# Patient Record
Sex: FEMALE | Race: OTHER | Employment: UNEMPLOYED | URBAN - METROPOLITAN AREA
[De-identification: names, ages, dates, MRNs, and addresses within clinical notes are randomized per-mention and may not be internally consistent; named-entity substitution may affect disease eponyms.]

---

## 2017-05-02 ENCOUNTER — HOSPITAL ENCOUNTER (OUTPATIENT)
Age: 27
Setting detail: SPECIMEN
Discharge: HOME | End: 2017-05-02
Payer: MEDICARE

## 2017-05-02 ENCOUNTER — INITIAL PRENATAL (OUTPATIENT)
Dept: OBGYN CLINIC | Age: 27
End: 2017-05-02
Payer: MEDICARE

## 2017-05-02 ENCOUNTER — HOSPITAL ENCOUNTER (OUTPATIENT)
Age: 27
Discharge: HOME | End: 2017-05-02
Payer: MEDICARE

## 2017-05-02 VITALS
HEART RATE: 78 BPM | DIASTOLIC BLOOD PRESSURE: 70 MMHG | SYSTOLIC BLOOD PRESSURE: 114 MMHG | WEIGHT: 143 LBS | BODY MASS INDEX: 24.55 KG/M2

## 2017-05-02 DIAGNOSIS — Z3A.14 14 WEEKS GESTATION OF PREGNANCY: Primary | ICD-10-CM

## 2017-05-02 DIAGNOSIS — O99.331 TOBACCO SMOKING AFFECTING PREGNANCY IN FIRST TRIMESTER: ICD-10-CM

## 2017-05-02 DIAGNOSIS — O98.811 CHLAMYDIA INFECTION COMPLICATING PREGNANCY IN FIRST TRIMESTER: ICD-10-CM

## 2017-05-02 DIAGNOSIS — Z32.01 POSITIVE PREGNANCY TEST: ICD-10-CM

## 2017-05-02 DIAGNOSIS — A74.9 CHLAMYDIA INFECTION COMPLICATING PREGNANCY IN FIRST TRIMESTER: ICD-10-CM

## 2017-05-02 DIAGNOSIS — O23.00: ICD-10-CM

## 2017-05-02 DIAGNOSIS — F19.11 H/O DRUG ABUSE (HCC): ICD-10-CM

## 2017-05-02 PROBLEM — N12 NEPHROPYELITIS: Status: ACTIVE | Noted: 2017-03-16

## 2017-05-02 PROBLEM — H52.10 ERROR, REFRACTIVE, MYOPIA: Status: ACTIVE | Noted: 2017-01-16

## 2017-05-02 PROBLEM — Z86.19 HISTORY OF POSITIVE HEPATITIS C: Status: ACTIVE | Noted: 2017-05-02

## 2017-05-02 LAB
ABO/RH: NORMAL
ABSOLUTE EOS #: 0.1 K/UL (ref 0–0.4)
ABSOLUTE LYMPH #: 1.5 K/UL (ref 1–4.8)
ABSOLUTE MONO #: 0.5 K/UL (ref 0.1–1.3)
ANTIBODY SCREEN: NEGATIVE
BASOPHILS # BLD: 1 %
BASOPHILS ABSOLUTE: 0.1 K/UL (ref 0–0.2)
BILIRUBIN URINE: NEGATIVE
COLOR: YELLOW
COMMENT UA: NORMAL
CONTROL: ABNORMAL
DIFFERENTIAL TYPE: NORMAL
EOSINOPHILS RELATIVE PERCENT: 1 %
GLUCOSE BLD-MCNC: 119 MG/DL (ref 70–99)
GLUCOSE URINE: NEGATIVE
HCG QUANTITATIVE: ABNORMAL IU/L
HCT VFR BLD CALC: 38.4 % (ref 36–46)
HEMOGLOBIN: 12.8 G/DL (ref 12–16)
HEPATITIS B SURFACE ANTIGEN: NONREACTIVE
HIV AG/AB: NONREACTIVE
KETONES, URINE: NEGATIVE
LEUKOCYTE ESTERASE, URINE: NEGATIVE
LYMPHOCYTES # BLD: 15 %
MCH RBC QN AUTO: 29.4 PG (ref 26–34)
MCHC RBC AUTO-ENTMCNC: 33.3 G/DL (ref 31–37)
MCV RBC AUTO: 88.1 FL (ref 80–100)
MONOCYTES # BLD: 5 %
NITRITE, URINE: NEGATIVE
PDW BLD-RTO: 13.3 % (ref 11.5–14.9)
PH UA: 6.5 (ref 5–8)
PLATELET # BLD: 197 K/UL (ref 150–450)
PLATELET ESTIMATE: NORMAL
PMV BLD AUTO: 10.8 FL (ref 6–12)
PREGNANCY TEST URINE, POC: POSITIVE
PROTEIN UA: NEGATIVE
RBC # BLD: 4.35 M/UL (ref 4–5.2)
RBC # BLD: NORMAL 10*6/UL
RUBV IGG SER QL: 60.8 IU/ML
SEG NEUTROPHILS: 78 %
SEGMENTED NEUTROPHILS ABSOLUTE COUNT: 7.8 K/UL (ref 1.3–9.1)
SPECIFIC GRAVITY UA: 1.01 (ref 1–1.03)
T. PALLIDUM, IGG: NONREACTIVE
TSH SERPL DL<=0.05 MIU/L-ACNC: 0.4 MIU/L (ref 0.3–5)
TURBIDITY: CLEAR
URINE HGB: NEGATIVE
UROBILINOGEN, URINE: NORMAL
WBC # BLD: 9.9 K/UL (ref 3.5–11)
WBC # BLD: NORMAL 10*3/UL

## 2017-05-02 PROCEDURE — 84450 TRANSFERASE (AST) (SGOT): CPT

## 2017-05-02 PROCEDURE — 84702 CHORIONIC GONADOTROPIN TEST: CPT

## 2017-05-02 PROCEDURE — 86762 RUBELLA ANTIBODY: CPT

## 2017-05-02 PROCEDURE — 81003 URINALYSIS AUTO W/O SCOPE: CPT

## 2017-05-02 PROCEDURE — 86900 BLOOD TYPING SEROLOGIC ABO: CPT

## 2017-05-02 PROCEDURE — 87340 HEPATITIS B SURFACE AG IA: CPT

## 2017-05-02 PROCEDURE — 85025 COMPLETE CBC W/AUTO DIFF WBC: CPT

## 2017-05-02 PROCEDURE — 86780 TREPONEMA PALLIDUM: CPT

## 2017-05-02 PROCEDURE — 84443 ASSAY THYROID STIM HORMONE: CPT

## 2017-05-02 PROCEDURE — 36415 COLL VENOUS BLD VENIPUNCTURE: CPT

## 2017-05-02 PROCEDURE — 81220 CFTR GENE COM VARIANTS: CPT

## 2017-05-02 PROCEDURE — 86850 RBC ANTIBODY SCREEN: CPT

## 2017-05-02 PROCEDURE — 87086 URINE CULTURE/COLONY COUNT: CPT

## 2017-05-02 PROCEDURE — 87491 CHLMYD TRACH DNA AMP PROBE: CPT

## 2017-05-02 PROCEDURE — 81025 URINE PREGNANCY TEST: CPT | Performed by: ADVANCED PRACTICE MIDWIFE

## 2017-05-02 PROCEDURE — 87389 HIV-1 AG W/HIV-1&-2 AB AG IA: CPT

## 2017-05-02 PROCEDURE — 99213 OFFICE O/P EST LOW 20 MIN: CPT | Performed by: ADVANCED PRACTICE MIDWIFE

## 2017-05-02 PROCEDURE — 86901 BLOOD TYPING SEROLOGIC RH(D): CPT

## 2017-05-02 PROCEDURE — 84460 ALANINE AMINO (ALT) (SGPT): CPT

## 2017-05-02 PROCEDURE — 82947 ASSAY GLUCOSE BLOOD QUANT: CPT

## 2017-05-02 PROCEDURE — 86803 HEPATITIS C AB TEST: CPT

## 2017-05-02 PROCEDURE — 87591 N.GONORRHOEAE DNA AMP PROB: CPT

## 2017-05-02 PROCEDURE — 87070 CULTURE OTHR SPECIMN AEROBIC: CPT

## 2017-05-02 PROCEDURE — G0145 SCR C/V CYTO,THINLAYER,RESCR: HCPCS

## 2017-05-02 RX ORDER — CEFDINIR 300 MG/1
CAPSULE ORAL
Refills: 3 | COMMUNITY
Start: 2017-03-21 | End: 2017-05-24 | Stop reason: ALTCHOICE

## 2017-05-03 LAB
C TRACH DNA GENITAL QL NAA+PROBE: NEGATIVE
CULTURE: NORMAL
CULTURE: NORMAL
Lab: NORMAL
N. GONORRHOEAE DNA: NEGATIVE
SPECIMEN DESCRIPTION: NORMAL
SPECIMEN DESCRIPTION: NORMAL
STATUS: NORMAL

## 2017-05-04 ENCOUNTER — TELEPHONE (OUTPATIENT)
Dept: OBGYN CLINIC | Age: 27
End: 2017-05-04

## 2017-05-04 DIAGNOSIS — Z86.19 HISTORY OF POSITIVE HEPATITIS C: Primary | ICD-10-CM

## 2017-05-04 PROBLEM — R87.612 LGSIL OF CERVIX OF UNDETERMINED SIGNIFICANCE: Status: ACTIVE | Noted: 2017-05-04

## 2017-05-04 LAB
ALT SERPL-CCNC: 16 U/L (ref 5–33)
AST SERPL-CCNC: 15 U/L
CYTOLOGY REPORT: NORMAL
HEPATITIS C ANTIBODY: REACTIVE

## 2017-05-05 LAB
CULTURE: NORMAL
Lab: NORMAL
SPECIMEN DESCRIPTION: NORMAL
SPECIMEN DESCRIPTION: NORMAL
STATUS: NORMAL

## 2017-05-11 LAB — CYSTIC FIBROSIS: NORMAL

## 2017-05-12 ENCOUNTER — HOSPITAL ENCOUNTER (OUTPATIENT)
Age: 27
Discharge: HOME | End: 2017-05-12
Payer: MEDICARE

## 2017-05-12 DIAGNOSIS — Z86.19 HISTORY OF POSITIVE HEPATITIS C: ICD-10-CM

## 2017-05-12 LAB
ALT SERPL-CCNC: 11 U/L (ref 5–33)
AST SERPL-CCNC: 11 U/L
HEPATITIS C ANTIBODY: REACTIVE

## 2017-05-12 PROCEDURE — 84450 TRANSFERASE (AST) (SGOT): CPT

## 2017-05-12 PROCEDURE — 87522 HEPATITIS C REVRS TRNSCRPJ: CPT

## 2017-05-12 PROCEDURE — 36415 COLL VENOUS BLD VENIPUNCTURE: CPT

## 2017-05-12 PROCEDURE — 84460 ALANINE AMINO (ALT) (SGPT): CPT

## 2017-05-12 PROCEDURE — 86803 HEPATITIS C AB TEST: CPT

## 2017-05-15 ENCOUNTER — TELEPHONE (OUTPATIENT)
Dept: OBGYN CLINIC | Age: 27
End: 2017-05-15

## 2017-05-15 PROBLEM — R76.8 HEPATITIS C ANTIBODY POSITIVE IN BLOOD: Status: ACTIVE | Noted: 2017-05-02

## 2017-05-16 LAB
DIRECT EXAM: NORMAL
Lab: NORMAL
SPECIMEN DESCRIPTION: NORMAL
STATUS: NORMAL

## 2017-05-17 ENCOUNTER — TELEPHONE (OUTPATIENT)
Dept: OBGYN CLINIC | Age: 27
End: 2017-05-17

## 2017-05-24 ENCOUNTER — ROUTINE PRENATAL (OUTPATIENT)
Dept: OBGYN CLINIC | Age: 27
End: 2017-05-24
Payer: MEDICARE

## 2017-05-24 VITALS
HEART RATE: 72 BPM | BODY MASS INDEX: 24.89 KG/M2 | SYSTOLIC BLOOD PRESSURE: 116 MMHG | WEIGHT: 145 LBS | DIASTOLIC BLOOD PRESSURE: 64 MMHG

## 2017-05-24 DIAGNOSIS — O23.00: ICD-10-CM

## 2017-05-24 DIAGNOSIS — O09.892: ICD-10-CM

## 2017-05-24 DIAGNOSIS — Z3A.17 17 WEEKS GESTATION OF PREGNANCY: Primary | ICD-10-CM

## 2017-05-24 DIAGNOSIS — R76.8 HEPATITIS C ANTIBODY POSITIVE IN BLOOD: ICD-10-CM

## 2017-05-24 DIAGNOSIS — A74.9 CHLAMYDIA INFECTION COMPLICATING PREGNANCY IN FIRST TRIMESTER: ICD-10-CM

## 2017-05-24 DIAGNOSIS — O98.811 CHLAMYDIA INFECTION COMPLICATING PREGNANCY IN FIRST TRIMESTER: ICD-10-CM

## 2017-05-24 DIAGNOSIS — O09.32 LATE PRENATAL CARE AFFECTING PREGNANCY IN SECOND TRIMESTER: Primary | ICD-10-CM

## 2017-05-24 DIAGNOSIS — F19.11 H/O DRUG ABUSE (HCC): ICD-10-CM

## 2017-05-24 DIAGNOSIS — Z87.440: ICD-10-CM

## 2017-05-24 DIAGNOSIS — O99.331 TOBACCO SMOKING AFFECTING PREGNANCY IN FIRST TRIMESTER: ICD-10-CM

## 2017-05-24 PROCEDURE — 99213 OFFICE O/P EST LOW 20 MIN: CPT | Performed by: ADVANCED PRACTICE MIDWIFE

## 2017-05-24 RX ORDER — PNV NO.95/FERROUS FUM/FOLIC AC 28MG-0.8MG
TABLET ORAL
COMMUNITY
End: 2020-06-08

## 2017-05-24 RX ORDER — FAMOTIDINE 20 MG/1
20 TABLET, FILM COATED ORAL 2 TIMES DAILY
Qty: 60 TABLET | Refills: 1 | Status: SHIPPED | OUTPATIENT
Start: 2017-05-24 | End: 2017-08-09

## 2017-06-21 ENCOUNTER — TELEPHONE (OUTPATIENT)
Dept: OBGYN CLINIC | Age: 27
End: 2017-06-21

## 2017-06-28 ENCOUNTER — TELEPHONE (OUTPATIENT)
Dept: OBGYN CLINIC | Age: 27
End: 2017-06-28

## 2017-06-29 ENCOUNTER — HOSPITAL ENCOUNTER (OUTPATIENT)
Age: 27
Setting detail: OBSERVATION
Discharge: HOME | End: 2017-06-30
Attending: OBSTETRICS & GYNECOLOGY | Admitting: OBSTETRICS & GYNECOLOGY
Payer: MEDICARE

## 2017-06-29 PROBLEM — F14.90 COCAINE USE: Status: ACTIVE | Noted: 2017-06-29

## 2017-06-29 PROBLEM — N93.9 VAGINAL BLEEDING: Status: ACTIVE | Noted: 2017-06-29

## 2017-06-29 LAB
-: ABNORMAL
ABSOLUTE EOS #: 0.2 K/UL (ref 0–0.4)
ABSOLUTE LYMPH #: 1.4 K/UL (ref 1–4.8)
ABSOLUTE MONO #: 0.6 K/UL (ref 0.1–1.2)
AMORPHOUS: ABNORMAL
AMPHETAMINE SCREEN URINE: NEGATIVE
BACTERIA: ABNORMAL
BARBITURATE SCREEN URINE: NEGATIVE
BASOPHILS # BLD: 0 %
BASOPHILS ABSOLUTE: 0 K/UL (ref 0–0.2)
BENZODIAZEPINE SCREEN, URINE: NEGATIVE
BILIRUBIN URINE: NEGATIVE
BUPRENORPHINE URINE: ABNORMAL
CANNABINOID SCREEN URINE: NEGATIVE
CASTS UA: ABNORMAL /LPF (ref 0–8)
COCAINE METABOLITE, URINE: POSITIVE
COLOR: YELLOW
CRYSTALS, UA: ABNORMAL /HPF
DIFFERENTIAL TYPE: ABNORMAL
EOSINOPHILS RELATIVE PERCENT: 2 %
EPITHELIAL CELLS UA: ABNORMAL /HPF (ref 0–5)
GLUCOSE URINE: NEGATIVE
HCT VFR BLD CALC: 34.8 % (ref 36–46)
HEMOGLOBIN: 11.8 G/DL (ref 12–16)
KETONES, URINE: NEGATIVE
LEUKOCYTE ESTERASE, URINE: NEGATIVE
LYMPHOCYTES # BLD: 17 %
MCH RBC QN AUTO: 29.7 PG (ref 26–34)
MCHC RBC AUTO-ENTMCNC: 33.9 G/DL (ref 31–37)
MCV RBC AUTO: 87.5 FL (ref 80–100)
MDMA URINE: ABNORMAL
METHADONE SCREEN, URINE: NEGATIVE
METHAMPHETAMINE, URINE: ABNORMAL
MONOCYTES # BLD: 7 %
MUCUS: ABNORMAL
NITRITE, URINE: NEGATIVE
OPIATES, URINE: NEGATIVE
OTHER OBSERVATIONS UA: ABNORMAL
OXYCODONE SCREEN URINE: NEGATIVE
PDW BLD-RTO: 14.5 % (ref 12.5–15.4)
PH UA: 6.5 (ref 5–8)
PHENCYCLIDINE, URINE: NEGATIVE
PLATELET # BLD: 215 K/UL (ref 140–450)
PLATELET ESTIMATE: ABNORMAL
PMV BLD AUTO: 9.2 FL (ref 6–12)
PROPOXYPHENE, URINE: ABNORMAL
PROTEIN UA: NEGATIVE
RBC # BLD: 3.98 M/UL (ref 4–5.2)
RBC # BLD: ABNORMAL 10*6/UL
RBC UA: ABNORMAL /HPF (ref 0–4)
RENAL EPITHELIAL, UA: ABNORMAL /HPF
SEG NEUTROPHILS: 74 %
SEGMENTED NEUTROPHILS ABSOLUTE COUNT: 6.3 K/UL (ref 1.8–7.7)
SPECIFIC GRAVITY UA: 1.01 (ref 1–1.03)
TEST INFORMATION: ABNORMAL
TRICHOMONAS: ABNORMAL
TRICYCLIC ANTIDEPRESSANTS, UR: ABNORMAL
TURBIDITY: CLEAR
URINE HGB: ABNORMAL
UROBILINOGEN, URINE: NORMAL
WBC # BLD: 8.5 K/UL (ref 3.5–11)
WBC # BLD: ABNORMAL 10*3/UL
WBC UA: ABNORMAL /HPF (ref 0–5)
YEAST: ABNORMAL

## 2017-06-29 PROCEDURE — 80307 DRUG TEST PRSMV CHEM ANLYZR: CPT

## 2017-06-29 PROCEDURE — 85025 COMPLETE CBC W/AUTO DIFF WBC: CPT

## 2017-06-29 PROCEDURE — 36415 COLL VENOUS BLD VENIPUNCTURE: CPT

## 2017-06-29 PROCEDURE — 81001 URINALYSIS AUTO W/SCOPE: CPT

## 2017-06-29 PROCEDURE — 87086 URINE CULTURE/COLONY COUNT: CPT

## 2017-06-29 PROCEDURE — 6370000000 HC RX 637 (ALT 250 FOR IP): Performed by: OBSTETRICS & GYNECOLOGY

## 2017-06-29 RX ORDER — ACETAMINOPHEN 500 MG
1000 TABLET ORAL ONCE
Status: COMPLETED | OUTPATIENT
Start: 2017-06-29 | End: 2017-06-29

## 2017-06-29 RX ADMIN — ACETAMINOPHEN 1000 MG: 500 TABLET ORAL at 19:56

## 2017-06-30 VITALS
DIASTOLIC BLOOD PRESSURE: 45 MMHG | SYSTOLIC BLOOD PRESSURE: 85 MMHG | RESPIRATION RATE: 18 BRPM | HEART RATE: 64 BPM | TEMPERATURE: 97.7 F

## 2017-06-30 LAB
ABSOLUTE EOS #: 0.3 K/UL (ref 0–0.4)
ABSOLUTE LYMPH #: 2.1 K/UL (ref 1–4.8)
ABSOLUTE MONO #: 0.7 K/UL (ref 0.1–1.2)
BASOPHILS # BLD: 0 %
BASOPHILS ABSOLUTE: 0 K/UL (ref 0–0.2)
CULTURE: NORMAL
CULTURE: NORMAL
DIFFERENTIAL TYPE: ABNORMAL
EOSINOPHILS RELATIVE PERCENT: 4 %
HCT VFR BLD CALC: 34.2 % (ref 36–46)
HEMOGLOBIN: 11.7 G/DL (ref 12–16)
LYMPHOCYTES # BLD: 23 %
Lab: NORMAL
MCH RBC QN AUTO: 30.1 PG (ref 26–34)
MCHC RBC AUTO-ENTMCNC: 34.3 G/DL (ref 31–37)
MCV RBC AUTO: 87.8 FL (ref 80–100)
MONOCYTES # BLD: 7 %
PDW BLD-RTO: 13.8 % (ref 12.5–15.4)
PLATELET # BLD: 198 K/UL (ref 140–450)
PLATELET ESTIMATE: ABNORMAL
PMV BLD AUTO: 9.7 FL (ref 6–12)
RBC # BLD: 3.89 M/UL (ref 4–5.2)
RBC # BLD: ABNORMAL 10*6/UL
SEG NEUTROPHILS: 66 %
SEGMENTED NEUTROPHILS ABSOLUTE COUNT: 6 K/UL (ref 1.8–7.7)
SPECIMEN DESCRIPTION: NORMAL
STATUS: NORMAL
WBC # BLD: 9.1 K/UL (ref 3.5–11)
WBC # BLD: ABNORMAL 10*3/UL

## 2017-06-30 PROCEDURE — 99213 OFFICE O/P EST LOW 20 MIN: CPT

## 2017-06-30 PROCEDURE — 6370000000 HC RX 637 (ALT 250 FOR IP): Performed by: STUDENT IN AN ORGANIZED HEALTH CARE EDUCATION/TRAINING PROGRAM

## 2017-06-30 PROCEDURE — 85025 COMPLETE CBC W/AUTO DIFF WBC: CPT

## 2017-06-30 PROCEDURE — G0378 HOSPITAL OBSERVATION PER HR: HCPCS

## 2017-06-30 PROCEDURE — 36415 COLL VENOUS BLD VENIPUNCTURE: CPT

## 2017-06-30 RX ORDER — ACETAMINOPHEN 500 MG
1000 TABLET ORAL ONCE
Status: DISCONTINUED | OUTPATIENT
Start: 2017-06-30 | End: 2017-06-30 | Stop reason: HOSPADM

## 2017-06-30 RX ORDER — ACETAMINOPHEN 500 MG
1000 TABLET ORAL EVERY 6 HOURS PRN
Status: DISCONTINUED | OUTPATIENT
Start: 2017-06-30 | End: 2017-06-30 | Stop reason: HOSPADM

## 2017-06-30 RX ADMIN — ACETAMINOPHEN 1000 MG: 500 TABLET ORAL at 10:16

## 2017-06-30 ASSESSMENT — PAIN DESCRIPTION - LOCATION: LOCATION: BACK

## 2017-06-30 ASSESSMENT — PAIN SCALES - GENERAL
PAINLEVEL_OUTOF10: 6
PAINLEVEL_OUTOF10: 3

## 2017-07-06 ENCOUNTER — TELEPHONE (OUTPATIENT)
Dept: OBGYN CLINIC | Age: 27
End: 2017-07-06

## 2017-07-13 ENCOUNTER — TELEPHONE (OUTPATIENT)
Dept: OBGYN CLINIC | Age: 27
End: 2017-07-13

## 2017-07-13 PROBLEM — Z91.199 NONCOMPLIANT PREGNANT PATIENT: Status: ACTIVE | Noted: 2017-07-13

## 2017-07-13 PROBLEM — O09.899 NONCOMPLIANT PREGNANT PATIENT: Status: ACTIVE | Noted: 2017-07-13

## 2017-07-18 ENCOUNTER — TELEPHONE (OUTPATIENT)
Dept: OBGYN CLINIC | Age: 27
End: 2017-07-18

## 2017-07-20 ENCOUNTER — ROUTINE PRENATAL (OUTPATIENT)
Dept: OBGYN CLINIC | Age: 27
End: 2017-07-20
Payer: MEDICARE

## 2017-07-20 VITALS
WEIGHT: 144 LBS | BODY MASS INDEX: 24.72 KG/M2 | HEART RATE: 68 BPM | SYSTOLIC BLOOD PRESSURE: 102 MMHG | DIASTOLIC BLOOD PRESSURE: 64 MMHG

## 2017-07-20 DIAGNOSIS — O09.899 NONCOMPLIANT PREGNANT PATIENT, UNSPECIFIED TRIMESTER: ICD-10-CM

## 2017-07-20 DIAGNOSIS — Z91.199 NONCOMPLIANT PREGNANT PATIENT, UNSPECIFIED TRIMESTER: ICD-10-CM

## 2017-07-20 DIAGNOSIS — O09.32 LATE PRENATAL CARE AFFECTING PREGNANCY IN SECOND TRIMESTER: ICD-10-CM

## 2017-07-20 DIAGNOSIS — R87.612 LGSIL OF CERVIX OF UNDETERMINED SIGNIFICANCE: ICD-10-CM

## 2017-07-20 DIAGNOSIS — O03.9 SAB (SPONTANEOUS ABORTION): ICD-10-CM

## 2017-07-20 DIAGNOSIS — O09.92 HRP (HIGH RISK PREGNANCY), SECOND TRIMESTER: Primary | ICD-10-CM

## 2017-07-20 DIAGNOSIS — F19.11 H/O DRUG ABUSE (HCC): ICD-10-CM

## 2017-07-20 DIAGNOSIS — Z3A.25 25 WEEKS GESTATION OF PREGNANCY: ICD-10-CM

## 2017-07-20 DIAGNOSIS — O02.0 BLIGHTED OVUM: ICD-10-CM

## 2017-07-20 DIAGNOSIS — R41.3 MEMORY LOSS: ICD-10-CM

## 2017-07-20 DIAGNOSIS — O23.00: ICD-10-CM

## 2017-07-20 PROCEDURE — 99213 OFFICE O/P EST LOW 20 MIN: CPT | Performed by: OBSTETRICS & GYNECOLOGY

## 2017-08-09 ENCOUNTER — ROUTINE PRENATAL (OUTPATIENT)
Dept: OBGYN CLINIC | Age: 27
End: 2017-08-09
Payer: MEDICARE

## 2017-08-09 VITALS
BODY MASS INDEX: 24.37 KG/M2 | HEART RATE: 68 BPM | WEIGHT: 142 LBS | DIASTOLIC BLOOD PRESSURE: 64 MMHG | SYSTOLIC BLOOD PRESSURE: 114 MMHG

## 2017-08-09 DIAGNOSIS — Z3A.28 28 WEEKS GESTATION OF PREGNANCY: Primary | ICD-10-CM

## 2017-08-09 PROCEDURE — 99213 OFFICE O/P EST LOW 20 MIN: CPT | Performed by: ADVANCED PRACTICE MIDWIFE

## 2017-08-29 PROBLEM — O09.899 NONCOMPLIANT PREGNANT PATIENT: Status: RESOLVED | Noted: 2017-07-13 | Resolved: 2017-08-29

## 2017-08-29 PROBLEM — O23.00: Status: RESOLVED | Noted: 2017-05-02 | Resolved: 2017-08-29

## 2017-08-29 PROBLEM — Z91.199 NONCOMPLIANT PREGNANT PATIENT: Status: RESOLVED | Noted: 2017-07-13 | Resolved: 2017-08-29

## 2017-08-29 PROBLEM — R76.8 HEPATITIS C ANTIBODY POSITIVE IN BLOOD: Status: RESOLVED | Noted: 2017-05-02 | Resolved: 2017-08-29

## 2017-08-29 PROBLEM — F14.90 COCAINE USE: Status: RESOLVED | Noted: 2017-06-29 | Resolved: 2017-08-29

## 2017-08-29 PROBLEM — O99.331 TOBACCO SMOKING AFFECTING PREGNANCY IN FIRST TRIMESTER: Status: RESOLVED | Noted: 2017-05-02 | Resolved: 2017-08-29

## 2019-03-02 ENCOUNTER — HOSPITAL ENCOUNTER (EMERGENCY)
Age: 29
Discharge: HOME | End: 2019-03-02
Attending: EMERGENCY MEDICINE
Payer: MEDICARE

## 2019-03-02 ENCOUNTER — APPOINTMENT (OUTPATIENT)
Dept: CT IMAGING | Age: 29
End: 2019-03-02
Payer: MEDICARE

## 2019-03-02 DIAGNOSIS — R11.2 NON-INTRACTABLE VOMITING WITH NAUSEA, UNSPECIFIED VOMITING TYPE: Primary | ICD-10-CM

## 2019-03-02 DIAGNOSIS — K29.00 ACUTE GASTRITIS WITHOUT HEMORRHAGE, UNSPECIFIED GASTRITIS TYPE: ICD-10-CM

## 2019-03-02 LAB
-: ABNORMAL
ABSOLUTE EOS #: 0.1 K/UL (ref 0–0.4)
ABSOLUTE IMMATURE GRANULOCYTE: ABNORMAL K/UL (ref 0–0.3)
ABSOLUTE LYMPH #: 1.1 K/UL (ref 1–4.8)
ABSOLUTE MONO #: 0.1 K/UL (ref 0.2–0.8)
ALBUMIN SERPL-MCNC: 4 G/DL (ref 3.5–5.2)
ALBUMIN/GLOBULIN RATIO: NORMAL (ref 1–2.5)
ALP BLD-CCNC: 84 U/L (ref 35–104)
ALT SERPL-CCNC: 12 U/L (ref 5–33)
AMORPHOUS: ABNORMAL
AST SERPL-CCNC: 13 U/L
BACTERIA: ABNORMAL
BASOPHILS # BLD: 0 % (ref 0–2)
BASOPHILS ABSOLUTE: 0 K/UL (ref 0–0.2)
BILIRUB SERPL-MCNC: 0.66 MG/DL (ref 0.3–1.2)
BILIRUBIN DIRECT: 0.13 MG/DL
BILIRUBIN URINE: NEGATIVE
BILIRUBIN, INDIRECT: 0.53 MG/DL (ref 0–1)
CASTS UA: ABNORMAL /LPF
COLOR: YELLOW
COMMENT UA: ABNORMAL
CRYSTALS, UA: ABNORMAL /HPF
DIFFERENTIAL TYPE: ABNORMAL
EOSINOPHILS RELATIVE PERCENT: 1 % (ref 1–4)
EPITHELIAL CELLS UA: ABNORMAL /HPF (ref 0–5)
GLOBULIN: NORMAL G/DL (ref 1.5–3.8)
GLUCOSE URINE: NEGATIVE
HCT VFR BLD CALC: 41.7 % (ref 36–46)
HEMOGLOBIN: 14.2 G/DL (ref 12–16)
IMMATURE GRANULOCYTES: ABNORMAL %
KETONES, URINE: NEGATIVE
LEUKOCYTE ESTERASE, URINE: NEGATIVE
LIPASE: 23 U/L (ref 13–60)
LYMPHOCYTES # BLD: 8 % (ref 24–44)
MCH RBC QN AUTO: 29 PG (ref 26–34)
MCHC RBC AUTO-ENTMCNC: 34.1 G/DL (ref 31–37)
MCV RBC AUTO: 85.1 FL (ref 80–100)
MONOCYTES # BLD: 1 % (ref 1–7)
MUCUS: ABNORMAL
NITRITE, URINE: NEGATIVE
NRBC AUTOMATED: ABNORMAL PER 100 WBC
OTHER OBSERVATIONS UA: ABNORMAL
PDW BLD-RTO: 13.2 % (ref 11.5–14.5)
PH UA: 6 (ref 5–8)
PLATELET # BLD: 308 K/UL (ref 130–400)
PLATELET ESTIMATE: ABNORMAL
PMV BLD AUTO: 9.3 FL (ref 6–12)
PROTEIN UA: NEGATIVE
RBC # BLD: 4.9 M/UL (ref 4–5.2)
RBC # BLD: ABNORMAL 10*6/UL
RBC UA: ABNORMAL /HPF (ref 0–2)
RENAL EPITHELIAL, UA: ABNORMAL /HPF
SEG NEUTROPHILS: 90 % (ref 36–66)
SEGMENTED NEUTROPHILS ABSOLUTE COUNT: 12.8 K/UL (ref 1.8–7.7)
SPECIFIC GRAVITY UA: 1.02 (ref 1–1.03)
TOTAL PROTEIN: 7.6 G/DL (ref 6.4–8.3)
TRICHOMONAS: ABNORMAL
TURBIDITY: CLEAR
URINE HGB: ABNORMAL
UROBILINOGEN, URINE: NORMAL
WBC # BLD: 14.2 K/UL (ref 3.5–11)
WBC # BLD: ABNORMAL 10*3/UL
WBC UA: ABNORMAL /HPF (ref 0–5)
YEAST: ABNORMAL

## 2019-03-02 PROCEDURE — 2580000003 HC RX 258: Performed by: EMERGENCY MEDICINE

## 2019-03-02 PROCEDURE — 81001 URINALYSIS AUTO W/SCOPE: CPT

## 2019-03-02 PROCEDURE — 99284 EMERGENCY DEPT VISIT MOD MDM: CPT

## 2019-03-02 PROCEDURE — 83690 ASSAY OF LIPASE: CPT

## 2019-03-02 PROCEDURE — 85025 COMPLETE CBC W/AUTO DIFF WBC: CPT

## 2019-03-02 PROCEDURE — 80076 HEPATIC FUNCTION PANEL: CPT

## 2019-03-02 PROCEDURE — 6360000004 HC RX CONTRAST MEDICATION: Performed by: EMERGENCY MEDICINE

## 2019-03-02 PROCEDURE — 84703 CHORIONIC GONADOTROPIN ASSAY: CPT

## 2019-03-02 RX ORDER — ONDANSETRON 4 MG/1
4 TABLET, ORALLY DISINTEGRATING ORAL 3 TIMES DAILY PRN
Qty: 21 TABLET | Refills: 0 | Status: SHIPPED | OUTPATIENT
Start: 2019-03-02 | End: 2020-06-08

## 2019-03-02 RX ORDER — KETOROLAC TROMETHAMINE 30 MG/ML
INJECTION, SOLUTION INTRAMUSCULAR; INTRAVENOUS
Status: DISPENSED
Start: 2019-03-02 | End: 2019-03-02

## 2019-03-02 RX ORDER — SODIUM CHLORIDE 0.9 % (FLUSH) 0.9 %
10 SYRINGE (ML) INJECTION PRN
Status: DISCONTINUED | OUTPATIENT
Start: 2019-03-02 | End: 2019-03-02 | Stop reason: HOSPADM

## 2019-03-02 RX ORDER — FAMOTIDINE 20 MG/1
20 TABLET, FILM COATED ORAL 2 TIMES DAILY
Qty: 180 TABLET | Refills: 1 | Status: SHIPPED | OUTPATIENT
Start: 2019-03-02 | End: 2020-06-08

## 2019-03-02 RX ORDER — 0.9 % SODIUM CHLORIDE 0.9 %
80 INTRAVENOUS SOLUTION INTRAVENOUS ONCE
Status: COMPLETED | OUTPATIENT
Start: 2019-03-02 | End: 2019-03-02

## 2019-03-02 RX ORDER — ONDANSETRON 2 MG/ML
INJECTION INTRAMUSCULAR; INTRAVENOUS
Status: DISPENSED
Start: 2019-03-02 | End: 2019-03-02

## 2019-03-02 RX ADMIN — IOPAMIDOL 75 ML: 755 INJECTION, SOLUTION INTRAVENOUS at 06:58

## 2019-03-02 RX ADMIN — Medication 10 ML: at 06:57

## 2019-03-02 RX ADMIN — SODIUM CHLORIDE 80 ML: 9 INJECTION, SOLUTION INTRAVENOUS at 06:57

## 2019-03-02 ASSESSMENT — ENCOUNTER SYMPTOMS
EYE PAIN: 0
EYE DISCHARGE: 0
CHEST TIGHTNESS: 0
BACK PAIN: 0
FACIAL SWELLING: 0
NAUSEA: 1
ABDOMINAL DISTENTION: 0
ABDOMINAL PAIN: 1
VOMITING: 1
SHORTNESS OF BREATH: 0

## 2019-03-04 LAB — HCG, PREGNANCY URINE (POC): NEGATIVE

## 2020-06-08 ENCOUNTER — OFFICE VISIT (OUTPATIENT)
Dept: OBGYN CLINIC | Age: 30
End: 2020-06-08
Payer: MEDICARE

## 2020-06-08 ENCOUNTER — HOSPITAL ENCOUNTER (OUTPATIENT)
Age: 30
Setting detail: SPECIMEN
Discharge: HOME | End: 2020-06-08
Payer: MEDICARE

## 2020-06-08 VITALS
WEIGHT: 139 LBS | BODY MASS INDEX: 23.73 KG/M2 | SYSTOLIC BLOOD PRESSURE: 114 MMHG | HEART RATE: 76 BPM | DIASTOLIC BLOOD PRESSURE: 72 MMHG | HEIGHT: 64 IN | TEMPERATURE: 97.2 F

## 2020-06-08 PROCEDURE — G0145 SCR C/V CYTO,THINLAYER,RESCR: HCPCS

## 2020-06-08 PROCEDURE — 99395 PREV VISIT EST AGE 18-39: CPT | Performed by: NURSE PRACTITIONER

## 2020-06-08 ASSESSMENT — PATIENT HEALTH QUESTIONNAIRE - PHQ9
SUM OF ALL RESPONSES TO PHQ9 QUESTIONS 1 & 2: 0
2. FEELING DOWN, DEPRESSED OR HOPELESS: 0
1. LITTLE INTEREST OR PLEASURE IN DOING THINGS: 0
SUM OF ALL RESPONSES TO PHQ QUESTIONS 1-9: 0
SUM OF ALL RESPONSES TO PHQ QUESTIONS 1-9: 0
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
## 2020-06-15 ENCOUNTER — TELEPHONE (OUTPATIENT)
Dept: OBGYN CLINIC | Age: 30
End: 2020-06-15

## 2020-06-15 LAB — CYTOLOGY REPORT: NORMAL

## 2020-06-15 RX ORDER — METRONIDAZOLE 500 MG/1
500 TABLET ORAL 2 TIMES DAILY
Qty: 14 TABLET | Refills: 0 | Status: SHIPPED | OUTPATIENT
Start: 2020-06-15 | End: 2020-06-22

## 2020-06-15 NOTE — TELEPHONE ENCOUNTER
----- Message from DANTE Rubio NP sent at 6/15/2020 10:49 AM EDT -----  + BV  Flagyl 500mg PO BID x 7 days

## 2023-01-09 DIAGNOSIS — N92.6 MISSED PERIOD: Primary | ICD-10-CM

## 2023-02-10 ENCOUNTER — OFFICE VISIT (OUTPATIENT)
Dept: OBGYN CLINIC | Age: 33
End: 2023-02-10

## 2023-02-10 VITALS
HEIGHT: 64 IN | BODY MASS INDEX: 22.71 KG/M2 | WEIGHT: 133 LBS | SYSTOLIC BLOOD PRESSURE: 128 MMHG | DIASTOLIC BLOOD PRESSURE: 82 MMHG

## 2023-02-10 DIAGNOSIS — R63.8 CRAVING FOR PARTICULAR FOOD: ICD-10-CM

## 2023-02-10 DIAGNOSIS — N91.2 AMENORRHEA: Primary | ICD-10-CM

## 2023-02-10 DIAGNOSIS — O09.32 LATE PRENATAL CARE AFFECTING PREGNANCY IN SECOND TRIMESTER: ICD-10-CM

## 2023-02-10 RX ORDER — FERROUS SULFATE 325(65) MG
325 TABLET ORAL
Qty: 90 TABLET | Refills: 1 | Status: SHIPPED | OUTPATIENT
Start: 2023-02-10

## 2023-02-10 RX ORDER — PNV NO.95/FERROUS FUM/FOLIC AC 28MG-0.8MG
1 TABLET ORAL DAILY
Qty: 30 TABLET | Refills: 12 | Status: SHIPPED | OUTPATIENT
Start: 2023-02-10 | End: 2023-03-12
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
## 2023-02-13 ENCOUNTER — PROCEDURE VISIT (OUTPATIENT)
Dept: OBGYN CLINIC | Age: 33
End: 2023-02-13

## 2023-02-13 ENCOUNTER — INITIAL PRENATAL (OUTPATIENT)
Dept: OBGYN CLINIC | Age: 33
End: 2023-02-13

## 2023-02-13 VITALS
SYSTOLIC BLOOD PRESSURE: 102 MMHG | WEIGHT: 133 LBS | DIASTOLIC BLOOD PRESSURE: 68 MMHG | HEART RATE: 83 BPM | BODY MASS INDEX: 22.83 KG/M2

## 2023-02-13 DIAGNOSIS — Z87.51 HISTORY OF PRETERM DELIVERY: ICD-10-CM

## 2023-02-13 DIAGNOSIS — Z86.19 HX OF COLD SORES: ICD-10-CM

## 2023-02-13 DIAGNOSIS — O09.32 LATE PRENATAL CARE AFFECTING PREGNANCY IN SECOND TRIMESTER: Primary | ICD-10-CM

## 2023-02-13 DIAGNOSIS — Z86.19 HISTORY OF HEPATITIS C: ICD-10-CM

## 2023-02-13 DIAGNOSIS — O09.32 LATE PRENATAL CARE AFFECTING PREGNANCY IN SECOND TRIMESTER: ICD-10-CM

## 2023-02-13 DIAGNOSIS — Z3A.28 28 WEEKS GESTATION OF PREGNANCY: ICD-10-CM

## 2023-02-13 DIAGNOSIS — O09.92 SUPERVISION OF HIGH RISK PREGNANCY IN SECOND TRIMESTER: ICD-10-CM

## 2023-02-13 PROBLEM — H52.10 ERROR, REFRACTIVE, MYOPIA: Status: RESOLVED | Noted: 2017-01-16 | Resolved: 2023-02-13

## 2023-02-13 PROBLEM — N93.9 VAGINAL BLEEDING: Status: RESOLVED | Noted: 2017-06-29 | Resolved: 2023-02-13

## 2023-02-13 PROBLEM — A74.9 CHLAMYDIA: Status: RESOLVED | Noted: 2017-03-20 | Resolved: 2023-02-13

## 2023-02-13 PROBLEM — N12 NEPHROPYELITIS: Status: RESOLVED | Noted: 2017-03-16 | Resolved: 2023-02-13

## 2023-02-13 LAB
CRL: NORMAL
SAC DIAMETER: NORMAL

## 2023-02-13 PROCEDURE — H1000 PRENATAL CARE ATRISK ASSESSM: HCPCS | Performed by: NURSE PRACTITIONER

## 2023-02-13 PROCEDURE — 99999 PR OFFICE/OUTPT VISIT,PROCEDURE ONLY: CPT | Performed by: NURSE PRACTITIONER

## 2023-02-13 RX ORDER — ONDANSETRON 4 MG/1
4 TABLET, ORALLY DISINTEGRATING ORAL EVERY 8 HOURS PRN
Qty: 30 TABLET | Refills: 2 | Status: SHIPPED | OUTPATIENT
Start: 2023-02-13

## 2023-02-13 RX ORDER — PNV,CALCIUM 72/IRON/FOLIC ACID 27 MG-1 MG
TABLET ORAL
COMMUNITY
Start: 2023-02-10 | End: 2023-02-13

## 2023-02-13 NOTE — PROGRESS NOTES
Patient presents to office for OB intake, nurse visit only. Intake completed following ultrasound in office to confirm pregnancy dating/viability. Based on ultrasound, patient is currently 28w1d  gestation, Estimated Date of Delivery: 5/7/23. Patient presents to intake FOB. This was a planned pregnancy. Patient currently taking has a current medication list which includes the following prescription(s): ondansetron and prenatal vitamins. . Patient's medical, surgical, obstetrical and family history reviewed. See EHR for details. Patient prenatal lab work given to patient at this visit as well as OB education material. New OB consent forms signed. Patient declined first trimester screening due to dating. Cystic Fibrosis screening previously done 5/2/17. Referral placed to [facility] for anatomy scan, referral to Gl for hx of hepatitis C. Patient scheduled for follow up OB appointment with Dr. Marylou Serrano on 3/9/23. Patient instructed to complete lab work prior to follow up OB appointment.

## 2023-02-24 PROBLEM — O43.109 PLACENTA, ABNORMAL: Status: ACTIVE | Noted: 2023-02-24

## 2023-03-15 ENCOUNTER — ROUTINE PRENATAL (OUTPATIENT)
Dept: OBGYN CLINIC | Age: 33
End: 2023-03-15
Payer: MEDICAID

## 2023-03-15 ENCOUNTER — HOSPITAL ENCOUNTER (OUTPATIENT)
Age: 33
Setting detail: SPECIMEN
Discharge: HOME | End: 2023-03-15

## 2023-03-15 VITALS
DIASTOLIC BLOOD PRESSURE: 84 MMHG | OXYGEN SATURATION: 95 % | HEART RATE: 83 BPM | SYSTOLIC BLOOD PRESSURE: 130 MMHG | WEIGHT: 133 LBS | BODY MASS INDEX: 22.83 KG/M2

## 2023-03-15 DIAGNOSIS — Z86.19 HX OF HEPATITIS C: ICD-10-CM

## 2023-03-15 DIAGNOSIS — O09.30 LATE PRENATAL CARE: Primary | ICD-10-CM

## 2023-03-15 DIAGNOSIS — Z87.59 HISTORY OF PLACENTA ABRUPTION: Chronic | ICD-10-CM

## 2023-03-15 DIAGNOSIS — O43.109 PLACENTAL ABNORMALITY, ANTEPARTUM: ICD-10-CM

## 2023-03-15 DIAGNOSIS — Z3A.32 32 WEEKS GESTATION OF PREGNANCY: ICD-10-CM

## 2023-03-15 DIAGNOSIS — Z87.59 HISTORY OF MISCARRIAGE: ICD-10-CM

## 2023-03-15 DIAGNOSIS — F12.10 MILD TETRAHYDROCANNABINOL (THC) ABUSE: ICD-10-CM

## 2023-03-15 DIAGNOSIS — F14.90 COCAINE USE COMPLICATING PREGNANCY: ICD-10-CM

## 2023-03-15 DIAGNOSIS — O99.320 COCAINE USE COMPLICATING PREGNANCY: ICD-10-CM

## 2023-03-15 DIAGNOSIS — O09.899 HX OF PRETERM DELIVERY, CURRENTLY PREGNANT: ICD-10-CM

## 2023-03-15 PROCEDURE — 99214 OFFICE O/P EST MOD 30 MIN: CPT | Performed by: NURSE PRACTITIONER

## 2023-03-15 RX ORDER — CYCLOBENZAPRINE HCL 10 MG
TABLET ORAL
COMMUNITY
Start: 2023-02-23

## 2023-03-15 SDOH — ECONOMIC STABILITY: HOUSING INSECURITY
IN THE LAST 12 MONTHS, WAS THERE A TIME WHEN YOU DID NOT HAVE A STEADY PLACE TO SLEEP OR SLEPT IN A SHELTER (INCLUDING NOW)?: NO

## 2023-03-15 SDOH — ECONOMIC STABILITY: INCOME INSECURITY: HOW HARD IS IT FOR YOU TO PAY FOR THE VERY BASICS LIKE FOOD, HOUSING, MEDICAL CARE, AND HEATING?: NOT HARD AT ALL

## 2023-03-15 SDOH — ECONOMIC STABILITY: FOOD INSECURITY: WITHIN THE PAST 12 MONTHS, THE FOOD YOU BOUGHT JUST DIDN'T LAST AND YOU DIDN'T HAVE MONEY TO GET MORE.: NEVER TRUE

## 2023-03-15 SDOH — ECONOMIC STABILITY: FOOD INSECURITY: WITHIN THE PAST 12 MONTHS, YOU WORRIED THAT YOUR FOOD WOULD RUN OUT BEFORE YOU GOT MONEY TO BUY MORE.: NEVER TRUE

## 2023-03-15 ASSESSMENT — PATIENT HEALTH QUESTIONNAIRE - PHQ9
2. FEELING DOWN, DEPRESSED OR HOPELESS: 0
SUM OF ALL RESPONSES TO PHQ QUESTIONS 1-9: 0
SUM OF ALL RESPONSES TO PHQ9 QUESTIONS 1 & 2: 0
1. LITTLE INTEREST OR PLEASURE IN DOING THINGS: 0
SUM OF ALL RESPONSES TO PHQ QUESTIONS 1-9: 0
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
## 2023-03-17 LAB
HPV SAMPLE: NORMAL
HPV, GENOTYPE 16: NOT DETECTED
HPV, GENOTYPE 18: NOT DETECTED
HPV, HIGH RISK OTHER: NOT DETECTED
HPV, INTERPRETATION: NORMAL
SPECIMEN DESCRIPTION: NORMAL

## 2023-03-19 LAB
MICROORGANISM SPEC CULT: NORMAL
SPECIMEN DESCRIPTION: NORMAL

## 2023-03-21 ENCOUNTER — TELEPHONE (OUTPATIENT)
Dept: OBGYN CLINIC | Age: 33
End: 2023-03-21

## 2023-03-21 RX ORDER — METRONIDAZOLE 500 MG/1
500 TABLET ORAL 2 TIMES DAILY
Qty: 14 TABLET | Refills: 0 | Status: SHIPPED | OUTPATIENT
Start: 2023-03-21 | End: 2023-03-28

## 2023-03-21 NOTE — TELEPHONE ENCOUNTER
----- Message from DANTE Alegria CNP sent at 3/20/2023  9:14 AM EDT -----  +BV- flagyl 500mg PO bID X7 days

## 2023-03-23 LAB — CYTOLOGY REPORT: NORMAL

## 2023-03-31 DIAGNOSIS — Z3A.28 28 WEEKS GESTATION OF PREGNANCY: ICD-10-CM

## 2023-03-31 DIAGNOSIS — O09.32 LATE PRENATAL CARE AFFECTING PREGNANCY IN SECOND TRIMESTER: ICD-10-CM

## 2023-03-31 RX ORDER — ONDANSETRON 4 MG/1
4 TABLET, ORALLY DISINTEGRATING ORAL EVERY 8 HOURS PRN
Qty: 30 TABLET | Refills: 2 | Status: SHIPPED | OUTPATIENT
Start: 2023-03-31

## 2023-04-04 ENCOUNTER — ANESTHESIA (OUTPATIENT)
Dept: LABOR AND DELIVERY | Age: 33
End: 2023-04-04
Payer: MEDICAID

## 2023-04-04 ENCOUNTER — HOSPITAL ENCOUNTER (INPATIENT)
Age: 33
LOS: 3 days | Discharge: HOME | End: 2023-04-07
Attending: OBSTETRICS & GYNECOLOGY | Admitting: OBSTETRICS & GYNECOLOGY
Payer: MEDICAID

## 2023-04-04 ENCOUNTER — ANESTHESIA EVENT (OUTPATIENT)
Dept: LABOR AND DELIVERY | Age: 33
End: 2023-04-04
Payer: MEDICAID

## 2023-04-04 PROBLEM — O99.323 DRUG DEPENDENCE DURING PREGNANCY IN THIRD TRIMESTER (HCC): Status: ACTIVE | Noted: 2023-04-04

## 2023-04-04 PROBLEM — Z3A.35 35 WEEKS GESTATION OF PREGNANCY: Status: ACTIVE | Noted: 2023-04-04

## 2023-04-04 PROBLEM — O99.323 COCAINE ABUSE AFFECTING PREGNANCY IN THIRD TRIMESTER (HCC): Status: ACTIVE | Noted: 2023-04-04

## 2023-04-04 PROBLEM — O60.00 PRETERM LABOR: Status: ACTIVE | Noted: 2023-04-04

## 2023-04-04 PROBLEM — O36.5990 IUGR (INTRAUTERINE GROWTH RESTRICTION) AFFECTING CARE OF MOTHER: Status: ACTIVE | Noted: 2023-04-04

## 2023-04-04 PROBLEM — O13.9 GESTATIONAL HYPERTENSION: Status: ACTIVE | Noted: 2023-04-04

## 2023-04-04 PROBLEM — F19.20 DRUG DEPENDENCE DURING PREGNANCY IN THIRD TRIMESTER (HCC): Status: ACTIVE | Noted: 2023-04-04

## 2023-04-04 PROBLEM — B19.20 HEPATITIS C: Status: ACTIVE | Noted: 2023-04-04

## 2023-04-04 PROBLEM — O09.33 INSUFFICIENT PRENATAL CARE IN THIRD TRIMESTER: Status: ACTIVE | Noted: 2023-04-04

## 2023-04-04 PROBLEM — F14.10 COCAINE ABUSE AFFECTING PREGNANCY IN THIRD TRIMESTER (HCC): Status: ACTIVE | Noted: 2023-04-04

## 2023-04-04 PROBLEM — O36.5930 INTRAUTERINE GROWTH RESTRICTION (IUGR) AFFECTING CARE OF MOTHER, THIRD TRIMESTER, SINGLE GESTATION: Status: ACTIVE | Noted: 2023-04-04

## 2023-04-04 LAB
ABO/RH: NORMAL
ABSOLUTE EOS #: 0.25 K/UL (ref 0–0.44)
ABSOLUTE IMMATURE GRANULOCYTE: 0.09 K/UL (ref 0–0.3)
ABSOLUTE LYMPH #: 1.68 K/UL (ref 1.1–3.7)
ABSOLUTE MONO #: 0.77 K/UL (ref 0.1–1.2)
ALBUMIN SERPL-MCNC: 2.8 G/DL (ref 3.5–5.2)
ALBUMIN/GLOBULIN RATIO: 0.7 (ref 1–2.5)
ALP SERPL-CCNC: 176 U/L (ref 35–104)
ALT SERPL-CCNC: 9 U/L (ref 5–33)
ANION GAP SERPL CALCULATED.3IONS-SCNC: 10 MMOL/L (ref 9–17)
ANTIBODY SCREEN: NEGATIVE
ARM BAND NUMBER: NORMAL
AST SERPL-CCNC: 11 U/L
BASOPHILS # BLD: 0 % (ref 0–2)
BASOPHILS ABSOLUTE: 0.06 K/UL (ref 0–0.2)
BILIRUB SERPL-MCNC: 0.2 MG/DL (ref 0.3–1.2)
BILIRUBIN URINE: NEGATIVE
BUN SERPL-MCNC: 9 MG/DL (ref 6–20)
CALCIUM SERPL-MCNC: 8.8 MG/DL (ref 8.6–10.4)
CANDIDA SPECIES, DNA PROBE: NEGATIVE
CHLORIDE SERPL-SCNC: 104 MMOL/L (ref 98–107)
CO2 SERPL-SCNC: 22 MMOL/L (ref 20–31)
COLOR: YELLOW
CREAT SERPL-MCNC: 0.55 MG/DL (ref 0.5–0.9)
CREATININE URINE: 89.1 MG/DL (ref 28–217)
EOSINOPHILS RELATIVE PERCENT: 2 % (ref 1–4)
EPITHELIAL CELLS UA: NORMAL /HPF (ref 0–5)
EXPIRATION DATE: NORMAL
GARDNERELLA VAGINALIS, DNA PROBE: NEGATIVE
GFR SERPL CREATININE-BSD FRML MDRD: >60 ML/MIN/1.73M2
GLUCOSE SERPL-MCNC: 109 MG/DL (ref 70–99)
GLUCOSE UR STRIP.AUTO-MCNC: NEGATIVE MG/DL
HBV SURFACE AG SER QL: NONREACTIVE
HCT VFR BLD AUTO: 33.2 % (ref 36.3–47.1)
HCV AB SER QL: REACTIVE
HGB BLD-MCNC: 11.2 G/DL (ref 11.9–15.1)
HIV 1+2 AB+HIV1 P24 AG SERPL QL IA: NONREACTIVE
IMMATURE GRANULOCYTES: 1 %
KETONES UR STRIP.AUTO-MCNC: NEGATIVE MG/DL
LEUKOCYTE ESTERASE UR QL STRIP.AUTO: ABNORMAL
LYMPHOCYTES # BLD: 12 % (ref 24–43)
MCH RBC QN AUTO: 28 PG (ref 25.2–33.5)
MCHC RBC AUTO-ENTMCNC: 33.7 G/DL (ref 28.4–34.8)
MCV RBC AUTO: 83 FL (ref 82.6–102.9)
MONOCYTES # BLD: 6 % (ref 3–12)
NITRITE UR QL STRIP.AUTO: NEGATIVE
NRBC AUTOMATED: 0 PER 100 WBC
PDW BLD-RTO: 13.1 % (ref 11.8–14.4)
PLATELET # BLD AUTO: 210 K/UL (ref 138–453)
PMV BLD AUTO: 11.3 FL (ref 8.1–13.5)
POTASSIUM SERPL-SCNC: 3.5 MMOL/L (ref 3.7–5.3)
PROT SERPL-MCNC: 6.9 G/DL (ref 6.4–8.3)
PROT UR STRIP.AUTO-MCNC: 7 MG/DL (ref 5–8)
PROT UR STRIP.AUTO-MCNC: NEGATIVE MG/DL
RBC # BLD: 4 M/UL (ref 3.95–5.11)
RBC CLUMPS #/AREA URNS AUTO: NORMAL /HPF (ref 0–4)
RUBV IGG SER QL: 135.7 IU/ML
SEG NEUTROPHILS: 79 % (ref 36–65)
SEGMENTED NEUTROPHILS ABSOLUTE COUNT: 11.01 K/UL (ref 1.5–8.1)
SODIUM SERPL-SCNC: 136 MMOL/L (ref 135–144)
SOURCE: NORMAL
SPECIFIC GRAVITY UA: 1.02 (ref 1–1.03)
T PALLIDUM AB SER QL IA: NONREACTIVE
TOTAL PROTEIN, URINE: 16 MG/DL
TRICHOMONAS VAGINALIS DNA: NEGATIVE
TURBIDITY: CLEAR
URINE HGB: NEGATIVE
URINE TOTAL PROTEIN CREATININE RATIO: 0.18 (ref 0–0.2)
UROBILINOGEN, URINE: NORMAL
WBC # BLD AUTO: 13.9 K/UL (ref 3.5–11.3)
WBC UA: NORMAL /HPF (ref 0–5)

## 2023-04-04 PROCEDURE — 86803 HEPATITIS C AB TEST: CPT

## 2023-04-04 PROCEDURE — 6370000000 HC RX 637 (ALT 250 FOR IP): Performed by: STUDENT IN AN ORGANIZED HEALTH CARE EDUCATION/TRAINING PROGRAM

## 2023-04-04 PROCEDURE — 87081 CULTURE SCREEN ONLY: CPT

## 2023-04-04 PROCEDURE — 84156 ASSAY OF PROTEIN URINE: CPT

## 2023-04-04 PROCEDURE — 2500000003 HC RX 250 WO HCPCS: Performed by: NURSE ANESTHETIST, CERTIFIED REGISTERED

## 2023-04-04 PROCEDURE — 2580000003 HC RX 258: Performed by: STUDENT IN AN ORGANIZED HEALTH CARE EDUCATION/TRAINING PROGRAM

## 2023-04-04 PROCEDURE — 76811 OB US DETAILED SNGL FETUS: CPT | Performed by: OBSTETRICS & GYNECOLOGY

## 2023-04-04 PROCEDURE — 1220000000 HC SEMI PRIVATE OB R&B

## 2023-04-04 PROCEDURE — 86850 RBC ANTIBODY SCREEN: CPT

## 2023-04-04 PROCEDURE — 6360000002 HC RX W HCPCS: Performed by: ANESTHESIOLOGY

## 2023-04-04 PROCEDURE — 87660 TRICHOMONAS VAGIN DIR PROBE: CPT

## 2023-04-04 PROCEDURE — 86762 RUBELLA ANTIBODY: CPT

## 2023-04-04 PROCEDURE — 76825 ECHO EXAM OF FETAL HEART: CPT | Performed by: OBSTETRICS & GYNECOLOGY

## 2023-04-04 PROCEDURE — 86900 BLOOD TYPING SEROLOGIC ABO: CPT

## 2023-04-04 PROCEDURE — 87340 HEPATITIS B SURFACE AG IA: CPT

## 2023-04-04 PROCEDURE — 86780 TREPONEMA PALLIDUM: CPT

## 2023-04-04 PROCEDURE — 87086 URINE CULTURE/COLONY COUNT: CPT

## 2023-04-04 PROCEDURE — 87491 CHLMYD TRACH DNA AMP PROBE: CPT

## 2023-04-04 PROCEDURE — 82570 ASSAY OF URINE CREATININE: CPT

## 2023-04-04 PROCEDURE — 87510 GARDNER VAG DNA DIR PROBE: CPT

## 2023-04-04 PROCEDURE — 86901 BLOOD TYPING SEROLOGIC RH(D): CPT

## 2023-04-04 PROCEDURE — 76819 FETAL BIOPHYS PROFIL W/O NST: CPT | Performed by: OBSTETRICS & GYNECOLOGY

## 2023-04-04 PROCEDURE — 87591 N.GONORRHOEAE DNA AMP PROB: CPT

## 2023-04-04 PROCEDURE — 76821 MIDDLE CEREBRAL ARTERY ECHO: CPT | Performed by: OBSTETRICS & GYNECOLOGY

## 2023-04-04 PROCEDURE — 81001 URINALYSIS AUTO W/SCOPE: CPT

## 2023-04-04 PROCEDURE — 83020 HEMOGLOBIN ELECTROPHORESIS: CPT

## 2023-04-04 PROCEDURE — 76820 UMBILICAL ARTERY ECHO: CPT | Performed by: OBSTETRICS & GYNECOLOGY

## 2023-04-04 PROCEDURE — 6360000002 HC RX W HCPCS: Performed by: NURSE ANESTHETIST, CERTIFIED REGISTERED

## 2023-04-04 PROCEDURE — 87480 CANDIDA DNA DIR PROBE: CPT

## 2023-04-04 PROCEDURE — 6360000002 HC RX W HCPCS: Performed by: STUDENT IN AN ORGANIZED HEALTH CARE EDUCATION/TRAINING PROGRAM

## 2023-04-04 PROCEDURE — 87522 HEPATITIS C REVRS TRNSCRPJ: CPT

## 2023-04-04 PROCEDURE — 3700000025 EPIDURAL BLOCK: Performed by: ANESTHESIOLOGY

## 2023-04-04 PROCEDURE — 87389 HIV-1 AG W/HIV-1&-2 AB AG IA: CPT

## 2023-04-04 PROCEDURE — 93325 DOPPLER ECHO COLOR FLOW MAPG: CPT | Performed by: OBSTETRICS & GYNECOLOGY

## 2023-04-04 PROCEDURE — 85025 COMPLETE CBC W/AUTO DIFF WBC: CPT

## 2023-04-04 PROCEDURE — 76827 ECHO EXAM OF FETAL HEART: CPT | Performed by: OBSTETRICS & GYNECOLOGY

## 2023-04-04 PROCEDURE — 80053 COMPREHEN METABOLIC PANEL: CPT

## 2023-04-04 RX ORDER — MORPHINE SULFATE 10 MG/ML
6 INJECTION, SOLUTION INTRAMUSCULAR; INTRAVENOUS ONCE
Status: COMPLETED | OUTPATIENT
Start: 2023-04-04 | End: 2023-04-04

## 2023-04-04 RX ORDER — ROPIVACAINE HYDROCHLORIDE 2 MG/ML
INJECTION, SOLUTION EPIDURAL; INFILTRATION; PERINEURAL
Status: COMPLETED
Start: 2023-04-04 | End: 2023-04-04

## 2023-04-04 RX ORDER — LIDOCAINE 4 G/G
1 PATCH TOPICAL DAILY
Status: DISCONTINUED | OUTPATIENT
Start: 2023-04-04 | End: 2023-04-07 | Stop reason: HOSPADM

## 2023-04-04 RX ORDER — MORPHINE SULFATE 4 MG/ML
4 INJECTION, SOLUTION INTRAMUSCULAR; INTRAVENOUS ONCE
Status: COMPLETED | OUTPATIENT
Start: 2023-04-04 | End: 2023-04-04

## 2023-04-04 RX ORDER — SODIUM CHLORIDE, SODIUM LACTATE, POTASSIUM CHLORIDE, AND CALCIUM CHLORIDE .6; .31; .03; .02 G/100ML; G/100ML; G/100ML; G/100ML
1000 INJECTION, SOLUTION INTRAVENOUS ONCE
Status: COMPLETED | OUTPATIENT
Start: 2023-04-04 | End: 2023-04-04

## 2023-04-04 RX ORDER — CYCLOBENZAPRINE HCL 10 MG
10 TABLET ORAL 3 TIMES DAILY PRN
Status: DISCONTINUED | OUTPATIENT
Start: 2023-04-04 | End: 2023-04-07 | Stop reason: HOSPADM

## 2023-04-04 RX ORDER — SODIUM CHLORIDE, SODIUM LACTATE, POTASSIUM CHLORIDE, CALCIUM CHLORIDE 600; 310; 30; 20 MG/100ML; MG/100ML; MG/100ML; MG/100ML
INJECTION, SOLUTION INTRAVENOUS CONTINUOUS
Status: DISCONTINUED | OUTPATIENT
Start: 2023-04-04 | End: 2023-04-05

## 2023-04-04 RX ORDER — ACETAMINOPHEN 500 MG
1000 TABLET ORAL EVERY 6 HOURS PRN
Status: DISCONTINUED | OUTPATIENT
Start: 2023-04-04 | End: 2023-04-05

## 2023-04-04 RX ORDER — BETAMETHASONE SODIUM PHOSPHATE AND BETAMETHASONE ACETATE 3; 3 MG/ML; MG/ML
12 INJECTION, SUSPENSION INTRA-ARTICULAR; INTRALESIONAL; INTRAMUSCULAR; SOFT TISSUE ONCE
Status: DISCONTINUED | OUTPATIENT
Start: 2023-04-04 | End: 2023-04-04

## 2023-04-04 RX ORDER — BETAMETHASONE SODIUM PHOSPHATE AND BETAMETHASONE ACETATE 3; 3 MG/ML; MG/ML
12 INJECTION, SUSPENSION INTRA-ARTICULAR; INTRALESIONAL; INTRAMUSCULAR; SOFT TISSUE EVERY 24 HOURS
Status: DISCONTINUED | OUTPATIENT
Start: 2023-04-04 | End: 2023-04-05

## 2023-04-04 RX ORDER — PROMETHAZINE HYDROCHLORIDE 12.5 MG/1
12.5 TABLET ORAL EVERY 6 HOURS PRN
Status: DISCONTINUED | OUTPATIENT
Start: 2023-04-04 | End: 2023-04-05

## 2023-04-04 RX ORDER — HYDROXYZINE HYDROCHLORIDE 25 MG/1
25 TABLET, FILM COATED ORAL 3 TIMES DAILY PRN
Status: DISCONTINUED | OUTPATIENT
Start: 2023-04-04 | End: 2023-04-07 | Stop reason: HOSPADM

## 2023-04-04 RX ORDER — ROPIVACAINE HYDROCHLORIDE 2 MG/ML
INJECTION, SOLUTION EPIDURAL; INFILTRATION; PERINEURAL PRN
Status: DISCONTINUED | OUTPATIENT
Start: 2023-04-04 | End: 2023-04-05 | Stop reason: SDUPTHER

## 2023-04-04 RX ORDER — ONDANSETRON 2 MG/ML
4 INJECTION INTRAMUSCULAR; INTRAVENOUS EVERY 6 HOURS PRN
Status: DISCONTINUED | OUTPATIENT
Start: 2023-04-04 | End: 2023-04-05

## 2023-04-04 RX ORDER — LIDOCAINE HYDROCHLORIDE AND EPINEPHRINE 15; 5 MG/ML; UG/ML
INJECTION, SOLUTION EPIDURAL PRN
Status: DISCONTINUED | OUTPATIENT
Start: 2023-04-04 | End: 2023-04-05 | Stop reason: SDUPTHER

## 2023-04-04 RX ADMIN — CYCLOBENZAPRINE 10 MG: 10 TABLET, FILM COATED ORAL at 17:42

## 2023-04-04 RX ADMIN — HYDROXYZINE HYDROCHLORIDE 25 MG: 25 TABLET ORAL at 11:04

## 2023-04-04 RX ADMIN — CYCLOBENZAPRINE 10 MG: 10 TABLET, FILM COATED ORAL at 09:27

## 2023-04-04 RX ADMIN — ROPIVACAINE HYDROCHLORIDE 10 ML/HR: 2 INJECTION, SOLUTION EPIDURAL; INFILTRATION at 23:40

## 2023-04-04 RX ADMIN — ROPIVACAINE HYDROCHLORIDE 10 ML/HR: 2 INJECTION, SOLUTION EPIDURAL; INFILTRATION at 23:23

## 2023-04-04 RX ADMIN — SODIUM CHLORIDE, POTASSIUM CHLORIDE, SODIUM LACTATE AND CALCIUM CHLORIDE 1000 ML: 600; 310; 30; 20 INJECTION, SOLUTION INTRAVENOUS at 22:27

## 2023-04-04 RX ADMIN — Medication 1 MILLI-UNITS/MIN: at 18:00

## 2023-04-04 RX ADMIN — SODIUM CHLORIDE, POTASSIUM CHLORIDE, SODIUM LACTATE AND CALCIUM CHLORIDE 1000 ML: 600; 310; 30; 20 INJECTION, SOLUTION INTRAVENOUS at 08:50

## 2023-04-04 RX ADMIN — SODIUM CHLORIDE, POTASSIUM CHLORIDE, SODIUM LACTATE AND CALCIUM CHLORIDE: 600; 310; 30; 20 INJECTION, SOLUTION INTRAVENOUS at 23:34

## 2023-04-04 RX ADMIN — BETAMETHASONE SODIUM PHOSPHATE AND BETAMETHASONE ACETATE 12 MG: 3; 3 INJECTION, SUSPENSION INTRA-ARTICULAR; INTRALESIONAL; INTRAMUSCULAR at 17:32

## 2023-04-04 RX ADMIN — SODIUM CHLORIDE 5 MILLION UNITS: 900 INJECTION INTRAVENOUS at 16:54

## 2023-04-04 RX ADMIN — Medication 2.5 MILLION UNITS: at 21:00

## 2023-04-04 RX ADMIN — MORPHINE SULFATE 6 MG: 10 INJECTION INTRAVENOUS at 10:53

## 2023-04-04 RX ADMIN — ROPIVACAINE HYDROCHLORIDE 8 ML: 2 INJECTION, SOLUTION EPIDURAL; INFILTRATION at 23:22

## 2023-04-04 RX ADMIN — MORPHINE SULFATE 4 MG: 4 INJECTION INTRAVENOUS at 09:02

## 2023-04-04 RX ADMIN — LIDOCAINE HYDROCHLORIDE,EPINEPHRINE BITARTRATE 3 ML: 15; .005 INJECTION, SOLUTION EPIDURAL; INFILTRATION; INTRACAUDAL; PERINEURAL at 23:17

## 2023-04-04 RX ADMIN — LIDOCAINE HYDROCHLORIDE,EPINEPHRINE BITARTRATE 2 ML: 15; .005 INJECTION, SOLUTION EPIDURAL; INFILTRATION; INTRACAUDAL; PERINEURAL at 23:20

## 2023-04-04 ASSESSMENT — PAIN SCALES - GENERAL
PAINLEVEL_OUTOF10: 10
PAINLEVEL_OUTOF10: 9
PAINLEVEL_OUTOF10: 7

## 2023-04-04 ASSESSMENT — PAIN DESCRIPTION - LOCATION
LOCATION: BACK;LEG
LOCATION: ABDOMEN;VAGINA;BACK

## 2023-04-04 ASSESSMENT — LIFESTYLE VARIABLES: SMOKING_STATUS: 1

## 2023-04-04 NOTE — FLOWSHEET NOTE
Pt to L&D with geni at side. Pt states she's been leaking for a few days, not sure exact. Pt states she's having ctx that she hasn't been timing, but rates them 8/10 on pain scale with constant pressure. Pt denies vaginal bleeding. +FM. Pt also says she's having severe back and bilateral leg pain.  Dr Jewel Leon notified of arrival.

## 2023-04-04 NOTE — H&P
M Vag-Spont  N BREA      Birth Comments: System Generated. Please review and update pregnancy details. 3 Term 11/09/10 37w1d  6 lb (2.722 kg) M Vag-Spont  N BREA   2 AB 2009        DEC      Birth Comments: No D&C   1 AB                PAST MEDICAL HISTORY:   has a past medical history of , elective or therapeutic, Bipolar 1 disorder , Bipolar disorder ([address] Blighted ovum, Chlamydia, Cocaine use, Depression, Error, refractive, myopia, GBS carrier, Headaches due to old head injury, Headaches due to old head injury, History of positive hepatitis C, Late PNC, Major Depressive Disorder, Memory loss, Nephropyelitis, and Vaginal bleeding. PAST SURGICAL HISTORY:   has a past surgical history that includes Cholecystectomy. ALLERGIES:  has No Known Allergies. MEDICATIONS:  Prior to Admission medications    Medication Sig Start Date End Date Taking? Authorizing Provider   ondansetron (ZOFRAN-ODT) 4 MG disintegrating tablet Take 1 tablet by mouth every 8 hours as needed for Nausea or Vomiting 3/31/23   DANTE Coleman - CNP   cyclobenzaprine (FLEXERIL) 10 MG tablet take 1 tablet by mouth three times a day if needed for muscle spasm  Patient not taking: Reported on 2023   Historical Provider, MD   Prenatal Vit-Fe Fumarate-FA (PRENATAL VITAMINS) 28-0.8 MG TABS Take 1 tablet by mouth daily 2/10/23 3/12/23  DANTE Howe NP       FAMILY HISTORY:  family history includes Diabetes in her maternal grandfather and mother; Hypertension in her mother. SOCIAL HISTORY:   reports that she has been smoking cigarettes. She started smoking about 9 years ago. She has been smoking an average of .2 packs per day. She has never used smokeless tobacco. She reports that she does not drink alcohol and does not use drugs.     VITALS:  Vitals:    23 0815 23 0828 23 0926   BP: (!) 154/105 (!) 152/102 (!) 148/90   Pulse: 100 94 99   Resp: 20 20 19   Temp: 98.2 °F (36.8 °C)     TempSrc: Oral

## 2023-04-04 NOTE — PROGRESS NOTES
Maternal Fetal Medicine   Ultrasound Interval Note    Sheila Morales is a 29 y.o. female [id number] at 35w1d     Patient has declined maternal genetic carrier screening and noninvasive prenatal testing. Patient declines formal consult with [facility] attending physician for significant medical history and ultrasound findings as noted below. Ultrasound discussed with Dr. Trinh Lima. [facility] Sono Report (Verbal): IUGR (AC and Overall EFW < 10th Percentile)    Plan: Transfer patient back to inpatient labor and delivery unit. Patient scheduled to follow up with weekly dopplers. Please refer to report for further details. Dr. Santo Valles updated, OB Residents updated, RN updated.      Stefanie Shi DO  Ob/Gyn Resident  4/4/2023, 4:06 PM

## 2023-04-04 NOTE — PROGRESS NOTES
OB/GYN Resident Interval Note    Patient reevaluated after M ultrasound which showed FGR. Spoke with patient that she is now in delivery window due to fetal growth restriction with concurrent condition (gestational hypertension). Patient agreeable to proceeding with induction of labor at this time. SVE performed, 2-3/50/-2. Damien RN at bedside to chaperone. Celestone and pen G ordered. Pitocin ordered. Also of note, patient reports improvement in hip pain at this time. Dr. Emiliana Arnett updated and in agreement with plan.     Elvia North DO  OB/GYN Resident

## 2023-04-04 NOTE — DISCHARGE SUMMARY
reviewed. Pelvic rest, and birth control were reviewed. Signs and symptoms of mastitis and post partum depression were reviewed. The patient is to notify her physician if any of these occur. The patient was counseled on secondary smoke risks and the increased risk of sudden infant death syndrome and respiratory problems to her baby with exposure. She was counseled on various alternate recommendations to decrease the exposure to secondary smoke to her children.

## 2023-04-05 LAB
ABSOLUTE EOS #: <0.03 K/UL (ref 0–0.44)
ABSOLUTE IMMATURE GRANULOCYTE: 0.09 K/UL (ref 0–0.3)
ABSOLUTE LYMPH #: 1.16 K/UL (ref 1.1–3.7)
ABSOLUTE MONO #: 0.68 K/UL (ref 0.1–1.2)
ALBUMIN SERPL-MCNC: 2.2 G/DL (ref 3.5–5.2)
ALBUMIN/GLOBULIN RATIO: 0.6 (ref 1–2.5)
ALP SERPL-CCNC: 155 U/L (ref 35–104)
ALT SERPL-CCNC: 8 U/L (ref 5–33)
ANION GAP SERPL CALCULATED.3IONS-SCNC: 13 MMOL/L (ref 9–17)
AST SERPL-CCNC: 14 U/L
BASOPHILS # BLD: 0 % (ref 0–2)
BASOPHILS ABSOLUTE: <0.03 K/UL (ref 0–0.2)
BILIRUB SERPL-MCNC: <0.1 MG/DL (ref 0.3–1.2)
BUN SERPL-MCNC: 6 MG/DL (ref 6–20)
C TRACH DNA SPEC QL PROBE+SIG AMP: NEGATIVE
CALCIUM SERPL-MCNC: 8.2 MG/DL (ref 8.6–10.4)
CHLORIDE SERPL-SCNC: 105 MMOL/L (ref 98–107)
CO2 SERPL-SCNC: 18 MMOL/L (ref 20–31)
CREAT SERPL-MCNC: 0.52 MG/DL (ref 0.5–0.9)
EOSINOPHILS RELATIVE PERCENT: 0 % (ref 1–4)
GFR SERPL CREATININE-BSD FRML MDRD: >60 ML/MIN/1.73M2
GLUCOSE SERPL-MCNC: 180 MG/DL (ref 70–99)
HCT VFR BLD AUTO: 33.4 % (ref 36.3–47.1)
HGB BLD-MCNC: 10.7 G/DL (ref 11.9–15.1)
IMMATURE GRANULOCYTES: 1 %
LYMPHOCYTES # BLD: 8 % (ref 24–43)
MCH RBC QN AUTO: 27.7 PG (ref 25.2–33.5)
MCHC RBC AUTO-ENTMCNC: 32 G/DL (ref 28.4–34.8)
MCV RBC AUTO: 86.5 FL (ref 82.6–102.9)
MICROORGANISM SPEC CULT: NO GROWTH
MONOCYTES # BLD: 5 % (ref 3–12)
N GONORRHOEA DNA SPEC QL PROBE+SIG AMP: NEGATIVE
NRBC AUTOMATED: 0 PER 100 WBC
PDW BLD-RTO: 13.2 % (ref 11.8–14.4)
PLATELET # BLD AUTO: 199 K/UL (ref 138–453)
PMV BLD AUTO: 12.1 FL (ref 8.1–13.5)
POTASSIUM SERPL-SCNC: 3.7 MMOL/L (ref 3.7–5.3)
PROT SERPL-MCNC: 5.9 G/DL (ref 6.4–8.3)
RBC # BLD: 3.86 M/UL (ref 3.95–5.11)
SEG NEUTROPHILS: 86 % (ref 36–65)
SEGMENTED NEUTROPHILS ABSOLUTE COUNT: 12.91 K/UL (ref 1.5–8.1)
SODIUM SERPL-SCNC: 136 MMOL/L (ref 135–144)
SPECIMEN DESCRIPTION: NORMAL
SPECIMEN DESCRIPTION: NORMAL
WBC # BLD AUTO: 14.9 K/UL (ref 3.5–11.3)

## 2023-04-05 PROCEDURE — 59409 OBSTETRICAL CARE: CPT | Performed by: OBSTETRICS & GYNECOLOGY

## 2023-04-05 PROCEDURE — 88307 TISSUE EXAM BY PATHOLOGIST: CPT

## 2023-04-05 PROCEDURE — 6370000000 HC RX 637 (ALT 250 FOR IP): Performed by: STUDENT IN AN ORGANIZED HEALTH CARE EDUCATION/TRAINING PROGRAM

## 2023-04-05 PROCEDURE — 36415 COLL VENOUS BLD VENIPUNCTURE: CPT

## 2023-04-05 PROCEDURE — 7200000001 HC VAGINAL DELIVERY

## 2023-04-05 PROCEDURE — 80053 COMPREHEN METABOLIC PANEL: CPT

## 2023-04-05 PROCEDURE — 2580000003 HC RX 258: Performed by: STUDENT IN AN ORGANIZED HEALTH CARE EDUCATION/TRAINING PROGRAM

## 2023-04-05 PROCEDURE — 1220000000 HC SEMI PRIVATE OB R&B

## 2023-04-05 PROCEDURE — 85025 COMPLETE CBC W/AUTO DIFF WBC: CPT

## 2023-04-05 RX ORDER — SODIUM CHLORIDE 0.9 % (FLUSH) 0.9 %
5-40 SYRINGE (ML) INJECTION EVERY 12 HOURS SCHEDULED
Status: DISCONTINUED | OUTPATIENT
Start: 2023-04-05 | End: 2023-04-07 | Stop reason: HOSPADM

## 2023-04-05 RX ORDER — BISACODYL 10 MG
10 SUPPOSITORY, RECTAL RECTAL DAILY PRN
Status: DISCONTINUED | OUTPATIENT
Start: 2023-04-05 | End: 2023-04-07 | Stop reason: HOSPADM

## 2023-04-05 RX ORDER — SODIUM CHLORIDE 0.9 % (FLUSH) 0.9 %
5-40 SYRINGE (ML) INJECTION PRN
Status: DISCONTINUED | OUTPATIENT
Start: 2023-04-05 | End: 2023-04-07 | Stop reason: HOSPADM

## 2023-04-05 RX ORDER — AMOXICILLIN 250 MG
1 CAPSULE ORAL DAILY
Qty: 60 TABLET | Refills: 0 | Status: SHIPPED | OUTPATIENT
Start: 2023-04-05 | End: 2023-06-04

## 2023-04-05 RX ORDER — ACETAMINOPHEN 500 MG
1000 TABLET ORAL EVERY 6 HOURS PRN
Qty: 60 TABLET | Refills: 0 | Status: SHIPPED | OUTPATIENT
Start: 2023-04-05 | End: 2023-05-05

## 2023-04-05 RX ORDER — ONDANSETRON 2 MG/ML
4 INJECTION INTRAMUSCULAR; INTRAVENOUS EVERY 4 HOURS PRN
Status: DISCONTINUED | OUTPATIENT
Start: 2023-04-05 | End: 2023-04-07 | Stop reason: HOSPADM

## 2023-04-05 RX ORDER — IBUPROFEN 600 MG/1
600 TABLET ORAL EVERY 6 HOURS PRN
Status: DISCONTINUED | OUTPATIENT
Start: 2023-04-05 | End: 2023-04-07 | Stop reason: HOSPADM

## 2023-04-05 RX ORDER — ACETAMINOPHEN 500 MG
1000 TABLET ORAL EVERY 6 HOURS
Status: DISCONTINUED | OUTPATIENT
Start: 2023-04-05 | End: 2023-04-07 | Stop reason: HOSPADM

## 2023-04-05 RX ORDER — SIMETHICONE 80 MG
80 TABLET,CHEWABLE ORAL EVERY 6 HOURS PRN
Status: DISCONTINUED | OUTPATIENT
Start: 2023-04-05 | End: 2023-04-07 | Stop reason: HOSPADM

## 2023-04-05 RX ORDER — LANOLIN 72 %
OINTMENT (GRAM) TOPICAL PRN
Status: DISCONTINUED | OUTPATIENT
Start: 2023-04-05 | End: 2023-04-07 | Stop reason: HOSPADM

## 2023-04-05 RX ORDER — IBUPROFEN 600 MG/1
600 TABLET ORAL EVERY 6 HOURS PRN
Qty: 60 TABLET | Refills: 0 | Status: SHIPPED | OUTPATIENT
Start: 2023-04-05

## 2023-04-05 RX ORDER — DOCUSATE SODIUM 100 MG/1
100 CAPSULE, LIQUID FILLED ORAL 2 TIMES DAILY
Status: DISCONTINUED | OUTPATIENT
Start: 2023-04-05 | End: 2023-04-07 | Stop reason: HOSPADM

## 2023-04-05 RX ORDER — SODIUM CHLORIDE 9 MG/ML
INJECTION, SOLUTION INTRAVENOUS PRN
Status: DISCONTINUED | OUTPATIENT
Start: 2023-04-05 | End: 2023-04-07 | Stop reason: HOSPADM

## 2023-04-05 RX ADMIN — ACETAMINOPHEN 1000 MG: 500 TABLET ORAL at 02:17

## 2023-04-05 RX ADMIN — BENZOCAINE AND LEVOMENTHOL: 200; 5 SPRAY TOPICAL at 09:08

## 2023-04-05 RX ADMIN — Medication 166.7 ML: at 00:55

## 2023-04-05 RX ADMIN — IBUPROFEN 600 MG: 600 TABLET, FILM COATED ORAL at 09:00

## 2023-04-05 RX ADMIN — IBUPROFEN 600 MG: 600 TABLET, FILM COATED ORAL at 22:34

## 2023-04-05 RX ADMIN — IBUPROFEN 600 MG: 600 TABLET, FILM COATED ORAL at 15:19

## 2023-04-05 RX ADMIN — DOCUSATE SODIUM 100 MG: 100 CAPSULE ORAL at 09:00

## 2023-04-05 RX ADMIN — DOCUSATE SODIUM 100 MG: 100 CAPSULE ORAL at 02:17

## 2023-04-05 RX ADMIN — ACETAMINOPHEN 1000 MG: 500 TABLET ORAL at 17:38

## 2023-04-05 RX ADMIN — ACETAMINOPHEN 1000 MG: 500 TABLET ORAL at 10:17

## 2023-04-05 RX ADMIN — SODIUM CHLORIDE, PRESERVATIVE FREE 10 ML: 5 INJECTION INTRAVENOUS at 09:00

## 2023-04-05 ASSESSMENT — PAIN - FUNCTIONAL ASSESSMENT
PAIN_FUNCTIONAL_ASSESSMENT: ACTIVITIES ARE NOT PREVENTED
PAIN_FUNCTIONAL_ASSESSMENT: ACTIVITIES ARE NOT PREVENTED
PAIN_FUNCTIONAL_ASSESSMENT: PREVENTS OR INTERFERES SOME ACTIVE ACTIVITIES AND ADLS
PAIN_FUNCTIONAL_ASSESSMENT: ACTIVITIES ARE NOT PREVENTED
PAIN_FUNCTIONAL_ASSESSMENT: ACTIVITIES ARE NOT PREVENTED

## 2023-04-05 ASSESSMENT — PAIN DESCRIPTION - PAIN TYPE
TYPE: ACUTE PAIN
TYPE: ACUTE PAIN

## 2023-04-05 ASSESSMENT — PAIN DESCRIPTION - DESCRIPTORS
DESCRIPTORS: CRAMPING

## 2023-04-05 ASSESSMENT — PAIN SCALES - GENERAL
PAINLEVEL_OUTOF10: 6
PAINLEVEL_OUTOF10: 7
PAINLEVEL_OUTOF10: 5
PAINLEVEL_OUTOF10: 6
PAINLEVEL_OUTOF10: 8
PAINLEVEL_OUTOF10: 6
PAINLEVEL_OUTOF10: 6

## 2023-04-05 ASSESSMENT — PAIN DESCRIPTION - LOCATION
LOCATION: ABDOMEN

## 2023-04-05 NOTE — PROGRESS NOTES
OBGYN Labor Progress Note    Jovani Rojo is a 29 y.o. female [id number] at 35w2d  The patient was seen and examined. Her pain is well controlled with epidural. She reports fetal movement is present, complains of contractions, denies loss of fluid, denies vaginal bleeding. She is agreeable to AROM.         Vital Signs:  Vitals:    04/04/23 2324 04/04/23 2331 04/04/23 2333 04/04/23 2343   BP: 127/81 130/62 131/72 129/85   Pulse: 87 79 89 (!) 108   Resp: 16  16 16   Temp:   98.5 °F (36.9 °C)    TempSrc:   Oral    SpO2:   97% 97%       FHT: 120, moderate variability, accelerations present, intermittent variable decelerations absent  Contractions: regular, every 2-3 minutes    Examination chaperoned by Volodymyr Parnell RN    Cervical Exam: 3 cm dilated, 90% effaced, 0 station  Pitocin: @ 10 mu/min    Membranes: Ruptured clear fluid at 0000  Scalp Electrode in place: absent  Intrauterine Pressure Catheter in Place: absent    Interventions: AROM     Assessment/Plan:  Jovani Rojo is a 29 y.o. female [id number] at 35w2d IUP   - GBS pending / Rh positive / R immune   - Pen G for GBS prophylaxis   - S/p Celestone x1    IOL 2/2 FGR (EFW <10%) and gHTN (newly diagnosed)   - VSS, BP normotensvie   - cEFM and TOCO   - IVF:  mL/hr   - Morphine x1   - PreE labs wnl, P/C 0.18   - Pitocin   - Epidural   - AROM clear fluid at 0000   - Continue expectant management      Attending and senior resident updated and in agreement with plan    Dc Madison DO  OBGYN Resident  4/5/2023, 12:07 AM

## 2023-04-05 NOTE — PROGRESS NOTES
Labor Progress Note    Dina Goodrich is a 29 y.o. female [id number] at 35w1d  The patient was seen and examined. Her pain is not well controlled. Patient doesn't report relief from epidural placement yet however felt a pop and a gush of fluid. She reports fetal movement is present, complains of contractions, complains of loss of fluid, denies vaginal bleeding. Perineum dry, towels/pad dry. SVE performed and bag palpated as intact. Offered AROM however patient would like epidural to kick in and declines at this time.       Vital Signs:  Vitals:    04/04/23 2321 04/04/23 2324 04/04/23 2331 04/04/23 2333   BP: 130/77 127/81 130/62 131/72   Pulse: 81 87 79 89   Resp: 16 16     Temp:       TempSrc:       SpO2: 97%           FHT: 120, moderate variability, accelerations present, decelerations absent  Contractions: regular, every 2-5 minutes    Chaperone for Intimate Exam: Chaperone was present for entire exam, Chaperone Name: Fabi Alamo RN  Cervical Exam: 2 cm dilated, 80 effaced, -1 station  Pitocin: @ 10 mu/min    Membranes: intact  Scalp Electrode in place: absent  Intrauterine Pressure Catheter in Place: absent    Interventions: SVE performed    Assessment/Plan:  Dina Goodrich is a 29 y.o. female [id number] at 35w1d admitted for IOL 2/2 FGR and gHTN   - GBS pending, Pen G for GBS prophylaxis              - Patient with some elevated BP, isolated severe range pressures, otherwise VSS              - cEFM/TOCO              - S/p Celestone x1 next @2222              - S/p Morphine x1              - Continue pitocin per protocol              - Pt just had epidural placed, not experiencing relief yet   - Pt believes water broke however low suspicion for SROM given dry towel/pads and bag palpated on exam   - Pt offered AROM and declined until more comfortable from epidural, will monitor closely    Attending updated and in agreement with plan    John Paul Hernandez DO  Ob/Gyn Resident  4/4/2023, 11:35 PM

## 2023-04-05 NOTE — ANESTHESIA PROCEDURE NOTES
Epidural Block    Patient location during procedure: OB  Reason for block: labor epidural  Staffing  Performed: resident/CRNA   Resident/CRNA: Claudia Holman, APRN - CRNA  Epidural  Patient position: sitting  Prep: Betadine  Patient monitoring: frequent blood pressure checks and continuous pulse ox  Approach: midline  Location: L3-4  Injection technique: ABEBE saline  Guidance: paresthesia technique  Provider prep: mask and sterile gloves  Needle  Needle type: Tuohy   Needle gauge: 17 G  Needle length: 3.5 in  Needle insertion depth: 7 cm  Catheter type: side hole  Catheter size: 19 G  Catheter at skin depth: 12 cm  Test dose: negativeCatheter Secured: tegaderm and tape  Assessment  Attempts: 2 (First attempt was positive for blood aspiration, we decided to place epidural at 1 level up no test dose given for first epidural)  Outcomes: uncomplicated and patient tolerated procedure well  Preanesthetic Checklist  Completed: patient identified, IV checked, site marked, risks and benefits discussed, surgical/procedural consents, equipment checked, pre-op evaluation, timeout performed, anesthesia consent given, oxygen available, monitors applied/VS acknowledged, fire risk safety assessment completed and verbalized and blood product R/B/A discussed and consented

## 2023-04-05 NOTE — PROGRESS NOTES
Labor Progress Note    Mike Bess is a 29 y.o. female [id number] at 35w1d  The patient was seen and examined. Her pain is not well controlled. She reports fetal movement is present, complains of contractions, denies loss of fluid, denies vaginal bleeding.        Vital Signs:  Vitals:    04/04/23 1800 04/04/23 2000 04/04/23 2101 04/04/23 2201   BP: (!) 148/97 (!) 145/92 110/71 98/66   Pulse: 68 95 83 71   Resp: 18 16 16 16   Temp:  97.9 °F (36.6 °C)     TempSrc:  Oral     SpO2:            FHT: 120, moderate variability, accelerations present, decelerations absent  Contractions: regular, every 2-5 minutes    Cervical Exam: deferred  Pitocin: @ 8 mu/min    Membranes: Intact  Scalp Electrode in place: absent  Intrauterine Pressure Catheter in Place: absent    Interventions: none    Assessment/Plan:  Mike Bess is a 29 y.o. female A2S6303 at 35w1d admitted for IOL 2/2 FGR and gHTN   - GBS unknown, Pen G for GBS prophylaxis   - Patient with some elevated BP, isolated severe range pressures, otherwise VSS   - cEFM/TOCO   - S/p Celestone x1 next @9823   - S/p Morphine x1   - Continue pitocin per protocol   - Plan for Epidural placement and then evaluation for AROM    Attending updated and in agreement with plan    Judge Primo DO  Ob/Gyn Resident  4/4/2023, 10:54 PM

## 2023-04-05 NOTE — ANESTHESIA PRE PROCEDURE
Department of Anesthesiology  Preprocedure Note       Name:  Lissette Mathew   Age:  29 y.o.  :  1990                                          MRN:  [id number]         Date:  2023      Surgeon: * No surgeons listed *    Procedure: * No procedures listed *    Medications prior to admission:   Prior to Admission medications    Medication Sig Start Date End Date Taking?  Authorizing Provider   ondansetron (ZOFRAN-ODT) 4 MG disintegrating tablet Take 1 tablet by mouth every 8 hours as needed for Nausea or Vomiting 3/31/23   DANTE Hawk - CNP   cyclobenzaprine (FLEXERIL) 10 MG tablet take 1 tablet by mouth three times a day if needed for muscle spasm  Patient not taking: Reported on 2023   Historical Provider, MD   Prenatal Vit-Fe Fumarate-FA (PRENATAL VITAMINS) 28-0.8 MG TABS Take 1 tablet by mouth daily 2/10/23 3/12/23  DANTE Stone - NP       Current medications:    Current Facility-Administered Medications   Medication Dose Route Frequency Provider Last Rate Last Admin    promethazine (PHENERGAN) tablet 12.5 mg  12.5 mg Oral Q6H PRN Shena Hamilton DO        Or    ondansetron [facility]) injection 4 mg  4 mg IntraVENous Q6H PRN Shena Hamilton, DO        acetaminophen (TYLENOL) tablet 1,000 mg  1,000 mg Oral Q6H PRN Shena Hamilton, DO        cyclobenzaprine (FLEXERIL) tablet 10 mg  10 mg Oral TID PRN Eduardo Mayer, DO   10 mg at 23 1742    lidocaine 4 % external patch 1 patch  1 patch TransDERmal Daily Bria Cassidy DO   1 patch at 23 0949    hydrOXYzine HCl (ATARAX) tablet 25 mg  25 mg Oral TID PRN Shena Hamilton, DO   25 mg at 23 1104    oxytocin (PITOCIN) 30 units in 500 mL infusion  1-20 fracisco-units/min IntraVENous Continuous Shena Hamilton, DO 8 mL/hr at 23 2223 8 fracisco-units/min at 23 2223    betamethasone acetate-betamethasone sodium phosphate (CELESTONE) injection 12 mg  12 mg IntraMUSCular Q24H Shena Hamilton, DO   12 mg at

## 2023-04-05 NOTE — L&D DELIVERY NOTE
) 2012             Post-operative Diagnosis:    Living  infant(s) and Male  Same as Pre-Op      Anesthesia:  epidural anesthesia    EBL: see QBL ml      Findings Summary:  Delivery Summary:  Mother's Information      Labor Events     Labor?: Yes  Cervical Ripening:   Now               [place], [address] [place] [address] [[id number]]      Labor Events     Labor?: Yes   Steroids?: None  Cervical Ripening Date/Time:     Antibiotics Received during Labor?: Yes  Rupture Identifier: Sac 1   Rupture Date/Time: 23 00:00:00   Rupture Type: AROM  Fluid Color: Clear  Fluid Odor: None  Fluid Volume: Scant  Induction: Oxytocin  Augmentation: AROM  Labor Complications: None       Anesthesia    Method: Epidural       Start Pushing      Labor onset date/time:   Now     Dilation complete date/time: 23 00:25:00 EDT Now     Start pushing date/time: 2023 00:48:22   Decision date/time (emergent ):           Delivery ([place])      Delivery Date/Time:  23 00:48:00   Delivery Type: Vaginal, Spontaneous    Details:            [place] Presentation    Presentation: Vertex  Position: Right  _: Occiput  _: Anterior       Shoulder Dystocia    Shoulder Dystocia Present?: No  Add Second Maneuver  Add Third Maneuver  Add Fourth Maneuver  Add Fifth Maneuver  Add Sixth Maneuver  Add Seventh Maneuver  Add Eighth Maneuver  Add Ninth Maneuver       Assisted Delivery Details    Forceps Attempted?: No  Vacuum Extractor Attempted?: No       Document Additional Attempt         Document Additional Attempt                 Cord    Vessels: 3 Vessels  Complications: None  Delayed Cord Clamping?: No  Cord Clamped Date/Time: 2023 00:49:00  Cord Blood Disposition: Lab  Gases Sent?: Yes       Placenta    Date/Time: 2023 00:54:50  Removal: Spontaneous  Appearance: Intact  Disposition: Lab       Lacerations    Episiotomy: None  Perineal Lacerations: None  Other Lacerations: no non-perineal

## 2023-04-05 NOTE — DISCHARGE INSTRUCTIONS
quitting for good. Eat a balanced and healthy diet that has lots of fruits and vegetables. Long-term health  After you have had preeclampsia, you have a higher-than-average risk of heart disease, stroke, and kidney disease. This may be because the same things that cause preeclampsia also cause heart and kidney disease. To protect your health, work with your doctor on living a heart-healthy lifestyle and getting the checkups you need. Your doctor may also want you to check your blood pressure at home. Follow-up care is a key part of your treatment and safety. Be sure to make and go to all appointments, and call your doctor if you are having problems. It's also a good idea to know your test results and keep a list of the medicines you take. When should you call for help? Share this information with your partner or a friend. They can help you watch for warning signs. Call 911  anytime you think you may need emergency care. For example, call if:    You passed out (lost consciousness). You have a seizure. Call your doctor now or seek immediate medical care if:    You have symptoms of preeclampsia, such as:  Sudden swelling of your face, hands, or feet. New vision problems (such as dimness, blurring, or seeing spots). A severe headache. Your blood pressure is very high, such as 160/110 or higher. Your blood pressure is higher than your doctor told you it should be, or it rises quickly. You have new nausea or vomiting. You have pain in your belly or pelvis. Watch closely for changes in your health, and be sure to contact your doctor if:    You gain weight rapidly. Where can you learn more? Go to http://www.woods.com/ and enter Q718 to learn more about \"Learning About Preeclampsia After Childbirth. \"  Current as of: November 9, 2022               Content Version: 13.6  © 0532-6461 [employer]wise[employer]. Care instructions adapted under license by [facility] ([facility]).  If you

## 2023-04-05 NOTE — ANESTHESIA POSTPROCEDURE EVALUATION
Department of Anesthesiology  Postprocedure Note    Patient: Reese Shoulders  MRN: [id number]  [place]: 1990  Date of evaluation: 4/5/2023      Procedure Summary     Date: 04/04/23 Room / Location:     Anesthesia Start: 2248 Anesthesia Stop: 04/05/23 0048    Procedure: Labor Analgesia Diagnosis:     Scheduled Providers:  Responsible Provider: Herberth Sandoval MD    Anesthesia Type: epidural ASA Status: 3          Anesthesia Type: No value filed.     Yuri Phase I: Yuri Score: 10    Yuri Phase II:        Anesthesia Post Evaluation    Patient location during evaluation: floor  Patient participation: complete - patient participated  Level of consciousness: awake and alert  Pain score: 0  Airway patency: patent  Nausea & Vomiting: no nausea and no vomiting  Complications: no  Cardiovascular status: blood pressure returned to baseline and hemodynamically stable  Respiratory status: acceptable and room air  Hydration status: stable  Multimodal analgesia pain management approach

## 2023-04-06 LAB
HCV RNA SERPL QL NAA+PROBE: NOT DETECTED
HGB ELECTROPHORESIS INTERP: NORMAL
PATHOLOGIST: NORMAL
SOURCE: NORMAL
SURGICAL PATHOLOGY REPORT: NORMAL

## 2023-04-06 PROCEDURE — 6370000000 HC RX 637 (ALT 250 FOR IP): Performed by: STUDENT IN AN ORGANIZED HEALTH CARE EDUCATION/TRAINING PROGRAM

## 2023-04-06 PROCEDURE — 1220000000 HC SEMI PRIVATE OB R&B

## 2023-04-06 RX ORDER — OXYCODONE HYDROCHLORIDE 5 MG/1
5 TABLET ORAL ONCE
Status: COMPLETED | OUTPATIENT
Start: 2023-04-06 | End: 2023-04-06

## 2023-04-06 RX ADMIN — ACETAMINOPHEN 1000 MG: 500 TABLET ORAL at 16:20

## 2023-04-06 RX ADMIN — CYCLOBENZAPRINE 10 MG: 10 TABLET, FILM COATED ORAL at 18:19

## 2023-04-06 RX ADMIN — DOCUSATE SODIUM 100 MG: 100 CAPSULE ORAL at 19:36

## 2023-04-06 RX ADMIN — IBUPROFEN 600 MG: 600 TABLET, FILM COATED ORAL at 04:07

## 2023-04-06 RX ADMIN — ACETAMINOPHEN 1000 MG: 500 TABLET ORAL at 02:47

## 2023-04-06 RX ADMIN — DOCUSATE SODIUM 100 MG: 100 CAPSULE ORAL at 09:55

## 2023-04-06 RX ADMIN — IBUPROFEN 600 MG: 600 TABLET, FILM COATED ORAL at 22:38

## 2023-04-06 RX ADMIN — CYCLOBENZAPRINE 10 MG: 10 TABLET, FILM COATED ORAL at 09:54

## 2023-04-06 RX ADMIN — IBUPROFEN 600 MG: 600 TABLET, FILM COATED ORAL at 16:21

## 2023-04-06 RX ADMIN — DOCUSATE SODIUM 100 MG: 100 CAPSULE ORAL at 02:47

## 2023-04-06 RX ADMIN — ACETAMINOPHEN 1000 MG: 500 TABLET ORAL at 09:54

## 2023-04-06 RX ADMIN — IBUPROFEN 600 MG: 600 TABLET, FILM COATED ORAL at 09:54

## 2023-04-06 RX ADMIN — OXYCODONE 5 MG: 5 TABLET ORAL at 05:59

## 2023-04-06 RX ADMIN — ACETAMINOPHEN 1000 MG: 500 TABLET ORAL at 22:38

## 2023-04-06 ASSESSMENT — PAIN SCALES - GENERAL
PAINLEVEL_OUTOF10: 7
PAINLEVEL_OUTOF10: 8

## 2023-04-06 ASSESSMENT — PAIN DESCRIPTION - ORIENTATION
ORIENTATION: LEFT
ORIENTATION: MID;LOWER
ORIENTATION: LEFT
ORIENTATION: LEFT

## 2023-04-06 ASSESSMENT — PAIN DESCRIPTION - LOCATION
LOCATION: ABDOMEN
LOCATION: ABDOMEN;BACK;HIP
LOCATION: ABDOMEN

## 2023-04-06 ASSESSMENT — PAIN DESCRIPTION - DESCRIPTORS
DESCRIPTORS: CRAMPING
DESCRIPTORS: DISCOMFORT
DESCRIPTORS: CRAMPING;SORE
DESCRIPTORS: CRAMPING

## 2023-04-06 NOTE — CARE COORDINATION
CASE MANAGEMENT POST-PARTUM TRANSITIONAL CARE PLAN    35 weeks gestation of pregnancy [Z3A.35]    OB Provider: Dr. Timur Seymour met w/ Justo Vallejo and BF/FOMINA Diaz Slider bedside to discuss DCP. She is S/P  on 23 @ 35w2d at 0048 of male infant    Writer verified address/phone number correct on facesheet. She states she lives with her BF/FOB Usama Clemens. Ava verbalized no difficulties with transportation to and from doctors appointments or with paying for medications upon discharge home. [employer] [address] Medicaid insurance correct. Writer notified Kaylyn Edmonds they have 30 days from date of birth to add  to insurance policy. They verbalized understanding. Ava confirmed a safe place for infant to sleep at home. Infant name on BC: [employer]. Infant PCP Dr. Daphene Phoenix.      DME: none  HOME CARE: none    Anticipate DC 23    Readmission Risk              Risk of Unplanned Readmission:  8
Social Work    [facility] CW is Elida (cell [phone]). Cw saw mom and baby. Cw provided information to [employer] to mom. Per CW if mom does not call and get accepted to [facility] case will be staffed. Cw informs that mom has 4 other children, no custody of any. It appears her 11year old is residing with her (MG has custody).  will check in with mom later today to determine if she called [facility] intake for admission and update CW.
Social Work    Sw checked in on mom to discuss if she has contacted [facility]. Mom reports that she does not plan to contact [employer], she plans to allow PGM (Janelle) to have placement of child and she plans to begin OP treatment at [facility]. Fam updated Zoraida Oar. Final Dc plan does need clarified via [facility].
or concerns arise. Per [facility] intake, no dc until Sw coordinates with [facility].             Sw Intern Kelechi Knox

## 2023-04-06 NOTE — FLOWSHEET NOTE
Reviewed S/S of pre-e with patient. Patient encouraged to call out if she starts having any s/s. Parent currently denies any. Call light within reach.

## 2023-04-06 NOTE — PLAN OF CARE
Problem: Pain  Goal: Verbalizes/displays adequate comfort level or baseline comfort level  2023 by Elzbieta Edgar RN  Outcome: Progressing  2023 1026 by Natacha Lovell RN  Outcome: Progressing  Flowsheets (Taken 2023 1026)  Verbalizes/displays adequate comfort level or baseline comfort level:   Encourage patient to monitor pain and request assistance   Assess pain using appropriate pain scale   Administer analgesics based on type and severity of pain and evaluate response   Implement non-pharmacological measures as appropriate and evaluate response     Problem: Safety - Adult  Goal: Free from fall injury  2023 by Elzbieta Edgar RN  Outcome: Progressing  2023 102 by Natacha Lovell RN  Outcome: Progressing  Flowsheets (Taken 2023 1026)  Free From Fall Injury: Instruct family/caregiver on patient safety     Problem: Postpartum  Goal: Experiences normal postpartum course  Description:  Postpartum OB-Pregnancy care plan goal which identifies if the mother is experiencing a normal postpartum course  2023 by Elzbieta Edgar RN  Outcome: Progressing  2023 1026 by Natacha Lovell RN  Outcome: Progressing  Flowsheets (Taken 2023 1026)  Experiences Normal Postpartum Course:   Monitor maternal vital signs   Assess uterine involution  Goal: Appropriate maternal -  bonding  Description:  Postpartum OB-Pregnancy care plan goal which identifies if the mother and  are bonding appropriately  2023 by Elzbieta Edgar RN  Outcome: Progressing  2023 1026 by Natacha Lovell RN  Outcome: Progressing  Goal: Establishment of infant feeding pattern  Description:  Postpartum OB-Pregnancy care plan goal which identifies if the mother is establishing a feeding pattern with their   2023 by Elzbieta Edgar RN  Outcome: Progressing  2023 1026 by Natacha Lovell RN  Outcome: Progressing  Flowsheets (Taken 2023 1026)  Establishment of Infant Feeding Pattern:
Problem: Pain  Goal: Verbalizes/displays adequate comfort level or baseline comfort level  2023 by Gabe Ventura  Outcome: Progressing  2023 by Maggie Hartman RN  Outcome: Progressing     Problem: Safety - Adult  Goal: Free from fall injury  2023 by Gabe Ventura  Outcome: Progressing  2023 by Maggie Hartman RN  Outcome: Progressing     Problem: Postpartum  Goal: Experiences normal postpartum course  Description:  Postpartum OB-Pregnancy care plan goal which identifies if the mother is experiencing a normal postpartum course  2023 by Gabe Ventura  Outcome: Progressing  2023 by Maggie Hartman RN  Outcome: Progressing  Goal: Appropriate maternal -  bonding  Description:  Postpartum OB-Pregnancy care plan goal which identifies if the mother and  are bonding appropriately  2023 by Gabe Ventura  Outcome: Progressing  2023 by Maggie Hartman RN  Outcome: Progressing  Goal: Establishment of infant feeding pattern  Description:  Postpartum OB-Pregnancy care plan goal which identifies if the mother is establishing a feeding pattern with their   2023 by Gabe Ventura  Outcome: Progressing  2023 by Maggie Hartman RN  Outcome: Progressing     Problem: Infection - Adult  Goal: Absence of infection during hospitalization  2023 by Gabe Ventura  Outcome: Progressing  Flowsheets (Taken 2023 1920)  Absence of infection during hospitalization: Assess and monitor for signs and symptoms of infection  2023 by Maggie Hartman RN  Outcome: Progressing     Problem: Discharge Planning  Goal: Discharge to home or other facility with appropriate resources  2023 by Gabe Ventura  Outcome: Progressing  Flowsheets (Taken 2023 1920)  Discharge to home or other facility with appropriate resources: Identify barriers to discharge with patient and caregiver  2023 by Maggie Hartman
Problem: Pain  Goal: Verbalizes/displays adequate comfort level or baseline comfort level  Outcome: Progressing     Problem: Safety - Adult  Goal: Free from fall injury  Outcome: Progressing
Problem: Pain  Goal: Verbalizes/displays adequate comfort level or baseline comfort level  Outcome: Progressing     Problem: Safety - Adult  Goal: Free from fall injury  Outcome: Progressing     Problem: Postpartum  Goal: Experiences normal postpartum course  Description:  Postpartum OB-Pregnancy care plan goal which identifies if the mother is experiencing a normal postpartum course  Outcome: Progressing  Goal: Appropriate maternal -  bonding  Description:  Postpartum OB-Pregnancy care plan goal which identifies if the mother and  are bonding appropriately  Outcome: Progressing  Goal: Establishment of infant feeding pattern  Description:  Postpartum OB-Pregnancy care plan goal which identifies if the mother is establishing a feeding pattern with their   Outcome: Progressing     Problem: Infection - Adult  Goal: Absence of infection during hospitalization  Outcome: Progressing     Problem: Discharge Planning  Goal: Discharge to home or other facility with appropriate resources  Outcome: Progressing     Problem: Chronic Conditions and Co-morbidities  Goal: Patient's chronic conditions and co-morbidity symptoms are monitored and maintained or improved  Outcome: Progressing
with appropriate resources: Identify barriers to discharge with patient and caregiver     Problem: Chronic Conditions and Co-morbidities  Goal: Patient's chronic conditions and co-morbidity symptoms are monitored and maintained or improved  Outcome: Progressing  Flowsheets (Taken 4/5/2023 1026)  Care Plan - Patient's Chronic Conditions and Co-Morbidity Symptoms are Monitored and Maintained or Improved: Monitor and assess patient's chronic conditions and comorbid symptoms for stability, deterioration, or improvement

## 2023-04-07 VITALS
TEMPERATURE: 97.5 F | OXYGEN SATURATION: 98 % | HEART RATE: 77 BPM | DIASTOLIC BLOOD PRESSURE: 91 MMHG | SYSTOLIC BLOOD PRESSURE: 125 MMHG | RESPIRATION RATE: 16 BRPM

## 2023-04-07 LAB
MICROORGANISM SPEC CULT: NORMAL
SPECIMEN DESCRIPTION: NORMAL

## 2023-04-07 PROCEDURE — 6370000000 HC RX 637 (ALT 250 FOR IP): Performed by: STUDENT IN AN ORGANIZED HEALTH CARE EDUCATION/TRAINING PROGRAM

## 2023-04-07 RX ORDER — ADHESIVE BANDAGE 3/4"
1 BANDAGE TOPICAL 3 TIMES DAILY
Qty: 1 EACH | Refills: 0 | Status: SHIPPED | OUTPATIENT
Start: 2023-04-07

## 2023-04-07 RX ADMIN — ACETAMINOPHEN 1000 MG: 500 TABLET ORAL at 05:02

## 2023-04-07 RX ADMIN — DOCUSATE SODIUM 100 MG: 100 CAPSULE ORAL at 09:27

## 2023-04-07 RX ADMIN — CYCLOBENZAPRINE 10 MG: 10 TABLET, FILM COATED ORAL at 02:36

## 2023-04-07 RX ADMIN — ACETAMINOPHEN 1000 MG: 500 TABLET ORAL at 12:13

## 2023-04-07 RX ADMIN — IBUPROFEN 600 MG: 600 TABLET, FILM COATED ORAL at 12:13

## 2023-04-07 RX ADMIN — CYCLOBENZAPRINE 10 MG: 10 TABLET, FILM COATED ORAL at 12:13

## 2023-04-07 RX ADMIN — IBUPROFEN 600 MG: 600 TABLET, FILM COATED ORAL at 05:02

## 2023-04-07 ASSESSMENT — PAIN DESCRIPTION - LOCATION: LOCATION: ABDOMEN

## 2023-04-07 ASSESSMENT — PAIN SCALES - GENERAL
PAINLEVEL_OUTOF10: 6
PAINLEVEL_OUTOF10: 6

## 2023-04-07 NOTE — FLOWSHEET NOTE
RN asked patient to call Dr. Kati Leonard office to set up blood pressure follow up at 1145am. Patient stated she would call to be seen in 1 week.  Rn went to room multiple times after, patient in NICU

## 2023-04-07 NOTE — ANESTHESIA POSTPROCEDURE EVALUATION
Department of Anesthesiology  Postprocedure Note    Patient: Sarkis Holloway  MRN: [id number]  [place]: 1990  Date of evaluation: 4/7/2023      Procedure Summary     Date: 04/04/23 Room / Location:     Anesthesia Start: 2248 Anesthesia Stop: 04/05/23 0048    Procedure: Labor Analgesia Diagnosis:     Scheduled Providers:  Responsible Provider: Adriel Walton MD    Anesthesia Type: epidural ASA Status: 3          Anesthesia Type: No value filed.     Yuri Phase I: Yuri Score: 10    Yuri Phase II: Yuri Score: 10      Anesthesia Post Evaluation    Patient location during evaluation: floor  Patient participation: complete - patient participated  Level of consciousness: awake and alert  Pain score: 2  Airway patency: patent  Nausea & Vomiting: no nausea and no vomiting  Complications: no  Cardiovascular status: hemodynamically stable  Respiratory status: acceptable  Hydration status: stable

## 2023-04-07 NOTE — PROGRESS NOTES
POST PARTUM DAY # 2    Dina Goodrich is a 29 y.o. female  This patient was seen & examined today.  on 23    Her pregnancy was complicated by:   Patient Active Problem List   Diagnosis    SAB (spontaneous )    LGSIL of cervix of undetermined significance    Late prenatal care affecting pregnancy in second trimester    Hx of cold sores    Late prenatal care    History of miscarriage    History of placenta abruption    Hx of  delivery, currently pregnant    Cocaine use complicating pregnancy    THC use in pregnancy    35 weeks gestation of pregnancy    gHTN  (G9)    FGR (G9)    Drug dependence during pregnancy in third trimester ([facility] [address]    Insufficient prenatal care in third trimester    Intrauterine growth restriction (IUGR) affecting care of mother, third trimester, single gestation    Cocaine abuse affecting pregnancy in third trimester ([facility] [address]    S/p Celestone x1 ()    Hepatitis C     23 M Apg 8/9 Wt 4#8       Today she is doing well without any chief complaint. Her lochia is light. She denies chest pain, shortness of breath, headache, lightheadedness and blurred vision. She is bottle feeding and she denies any breast tenderness. She is ambulating well. Her voiding pattern is normal. I reviewed signs and symptoms of post partum depression with the patient, she currently denies any of these symptoms. She is tolerating solids.      Vital Signs:  Vitals:    23 0200   BP: (!) 136/96   Pulse: 86   Resp: 18   Temp: 98.1 °F (36.7 °C)   SpO2:        Physical Exam:  General:  no apparent distress, alert and cooperative  Neurologic:  alert, oriented, normal speech, no focal findings or movement disorder noted  Lungs:  No increased work of breathing, good air exchange, clear to auscultation bilaterally, no crackles or wheezing  Heart:  Normal apical impulse, regular rate and rhythm, normal S1 and S2, no S3 or S4, and no murmur noted    Abdomen: abdomen soft, non-distended,

## 2023-04-07 NOTE — PROGRESS NOTES
CLINICAL PHARMACY NOTE: MEDS TO BEDS    Total # of Prescriptions Filled: 3   The following medications were delivered to the patient:  Ibuprofen 600mg tabs  Acetaminophen ex strength 500mg tabs  Senexon-s 8.6-50mg tabs    Additional Documentation:   Delivered to pt + 1 in room 736 on 4/7 at 9:45AM. No copay

## 2023-04-07 NOTE — FLOWSHEET NOTE
Discharge instructions reviewed with patient. Medications and follow up reviewed, patient verbalized understanding. Pre- E and high blood pressure S/S reviewed in length.

## 2023-12-14 ENCOUNTER — OFFICE VISIT (OUTPATIENT)
Dept: FAMILY MEDICINE CLINIC | Age: 33
End: 2023-12-14
Payer: MEDICAID

## 2023-12-14 VITALS
WEIGHT: 129.6 LBS | HEART RATE: 98 BPM | SYSTOLIC BLOOD PRESSURE: 134 MMHG | OXYGEN SATURATION: 98 % | TEMPERATURE: 97.6 F | DIASTOLIC BLOOD PRESSURE: 86 MMHG | BODY MASS INDEX: 22.13 KG/M2 | HEIGHT: 64 IN

## 2023-12-14 DIAGNOSIS — M79.672 BILATERAL FOOT PAIN: ICD-10-CM

## 2023-12-14 DIAGNOSIS — Z11.59 SCREENING FOR VIRAL DISEASE: ICD-10-CM

## 2023-12-14 DIAGNOSIS — R42 DIZZINESS: ICD-10-CM

## 2023-12-14 DIAGNOSIS — R53.83 OTHER FATIGUE: ICD-10-CM

## 2023-12-14 DIAGNOSIS — Z76.89 ENCOUNTER TO ESTABLISH CARE: ICD-10-CM

## 2023-12-14 DIAGNOSIS — Z13.220 SCREENING FOR HYPERLIPIDEMIA: ICD-10-CM

## 2023-12-14 DIAGNOSIS — R55 SYNCOPE, UNSPECIFIED SYNCOPE TYPE: ICD-10-CM

## 2023-12-14 DIAGNOSIS — Z13.1 SCREENING FOR DIABETES MELLITUS: ICD-10-CM

## 2023-12-14 DIAGNOSIS — F32.A ANXIETY AND DEPRESSION: Primary | ICD-10-CM

## 2023-12-14 DIAGNOSIS — M79.671 BILATERAL FOOT PAIN: ICD-10-CM

## 2023-12-14 DIAGNOSIS — S16.1XXA STRAIN OF NECK MUSCLE, INITIAL ENCOUNTER: ICD-10-CM

## 2023-12-14 DIAGNOSIS — F41.9 ANXIETY AND DEPRESSION: Primary | ICD-10-CM

## 2023-12-14 PROCEDURE — 99215 OFFICE O/P EST HI 40 MIN: CPT | Performed by: NURSE PRACTITIONER

## 2023-12-14 RX ORDER — TIZANIDINE 4 MG/1
4 TABLET ORAL EVERY 8 HOURS PRN
Qty: 30 TABLET | Refills: 0 | Status: SHIPPED | OUTPATIENT
Start: 2023-12-14 | End: 2023-12-24

## 2023-12-14 RX ORDER — METHYLPREDNISOLONE 4 MG/1
TABLET ORAL
Qty: 1 KIT | Refills: 0 | Status: SHIPPED | OUTPATIENT
Start: 2023-12-14 | End: 2023-12-20

## 2023-12-14 RX ORDER — PENICILLIN V POTASSIUM 500 MG/1
TABLET ORAL
COMMUNITY
Start: 2023-09-15

## 2023-12-14 SDOH — ECONOMIC STABILITY: INCOME INSECURITY: HOW HARD IS IT FOR YOU TO PAY FOR THE VERY BASICS LIKE FOOD, HOUSING, MEDICAL CARE, AND HEATING?: VERY HARD

## 2023-12-14 SDOH — ECONOMIC STABILITY: FOOD INSECURITY: WITHIN THE PAST 12 MONTHS, THE FOOD YOU BOUGHT JUST DIDN'T LAST AND YOU DIDN'T HAVE MONEY TO GET MORE.: SOMETIMES TRUE

## 2023-12-14 SDOH — ECONOMIC STABILITY: FOOD INSECURITY: WITHIN THE PAST 12 MONTHS, YOU WORRIED THAT YOUR FOOD WOULD RUN OUT BEFORE YOU GOT MONEY TO BUY MORE.: SOMETIMES TRUE

## 2023-12-14 ASSESSMENT — PATIENT HEALTH QUESTIONNAIRE - PHQ9
3. TROUBLE FALLING OR STAYING ASLEEP: 3
6. FEELING BAD ABOUT YOURSELF - OR THAT YOU ARE A FAILURE OR HAVE LET YOURSELF OR YOUR FAMILY DOWN: 3
10. IF YOU CHECKED OFF ANY PROBLEMS, HOW DIFFICULT HAVE THESE PROBLEMS MADE IT FOR YOU TO DO YOUR WORK, TAKE CARE OF THINGS AT HOME, OR GET ALONG WITH OTHER PEOPLE: 2
SUM OF ALL RESPONSES TO PHQ QUESTIONS 1-9: 21
SUM OF ALL RESPONSES TO PHQ QUESTIONS 1-9: 21
SUM OF ALL RESPONSES TO PHQ9 QUESTIONS 1 & 2: 3
9. THOUGHTS THAT YOU WOULD BE BETTER OFF DEAD, OR OF HURTING YOURSELF: 0
5. POOR APPETITE OR OVEREATING: 3
7. TROUBLE CONCENTRATING ON THINGS, SUCH AS READING THE NEWSPAPER OR WATCHING TELEVISION: 3
8. MOVING OR SPEAKING SO SLOWLY THAT OTHER PEOPLE COULD HAVE NOTICED. OR THE OPPOSITE, BEING SO FIGETY OR RESTLESS THAT YOU HAVE BEEN MOVING AROUND A LOT MORE THAN USUAL: 3
4. FEELING TIRED OR HAVING LITTLE ENERGY: 3
1. LITTLE INTEREST OR PLEASURE IN DOING THINGS: 0
SUM OF ALL RESPONSES TO PHQ QUESTIONS 1-9: 21
2. FEELING DOWN, DEPRESSED OR HOPELESS: 3
SUM OF ALL RESPONSES TO PHQ QUESTIONS 1-9: 21

## 2023-12-14 ASSESSMENT — ANXIETY QUESTIONNAIRES
3. WORRYING TOO MUCH ABOUT DIFFERENT THINGS: 2
5. BEING SO RESTLESS THAT IT IS HARD TO SIT STILL: 3
6. BECOMING EASILY ANNOYED OR IRRITABLE: 2
2. NOT BEING ABLE TO STOP OR CONTROL WORRYING: 3
GAD7 TOTAL SCORE: 17
4. TROUBLE RELAXING: 3
1. FEELING NERVOUS, ANXIOUS, OR ON EDGE: 1
7. FEELING AFRAID AS IF SOMETHING AWFUL MIGHT HAPPEN: 3

## 2023-12-14 ASSESSMENT — COLUMBIA-SUICIDE SEVERITY RATING SCALE - C-SSRS
6. HAVE YOU EVER DONE ANYTHING, STARTED TO DO ANYTHING, OR PREPARED TO DO ANYTHING TO END YOUR LIFE?: NO
2. HAVE YOU ACTUALLY HAD ANY THOUGHTS OF KILLING YOURSELF?: NO
1. WITHIN THE PAST MONTH, HAVE YOU WISHED YOU WERE DEAD OR WISHED YOU COULD GO TO SLEEP AND NOT WAKE UP?: NO

## 2023-12-14 NOTE — PROGRESS NOTES
Visit Information    Have you changed or started any medications since your last visit including any over-the-counter medicines, vitamins, or herbal medicines? no   Have you stopped taking any of your medications? Is so, why? -  no  Are you having any side effects from any of your medications? - no    Have you seen any other physician or provider since your last visit?  no   Have you had any other diagnostic tests since your last visit?  no   Have you been seen in the emergency room and/or had an admission in a hospital since we last saw you?  no   Have you had your routine dental cleaning in the past 6 months?  no     Do you have an active MyChart account? If no, what is the barrier?   No:     Patient Care Team:  Jamila Davison DO as Consulting Physician (Obstetrics & Gynecology)  Arnie Vance MD as Obstetrician (Perinatology)  DANTE Harrison CNM as Midwife (Certified Nurse Midwife)    Medical History Review  Past Medical, Family, and Social History reviewed and does contribute to the patient presenting condition    Health Maintenance   Topic Date Due    COVID-19 Vaccine (1) Never done    Varicella vaccine (1 of 2 - 2-dose childhood series) Never done    Pneumococcal 0-64 years Vaccine (1 - PCV) Never done    DTaP/Tdap/Td vaccine (6 - Tdap) 06/24/2003    Flu vaccine (1) Never done    Depression Screen  03/15/2024    Cervical cancer screen  03/15/2028    Hepatitis B vaccine  Completed    Hib vaccine  Completed    HPV vaccine  Completed    HIV screen  Completed    Hepatitis A vaccine  Aged Out    Meningococcal (ACWY) vaccine  Aged Out    Depression Monitoring  Discontinued    Hepatitis C screen  Discontinued

## 2023-12-14 NOTE — PROGRESS NOTES
Bob Ballard (:  1990) is a 34 y.o. female,New patient, here for evaluation of the following chief complaint(s): Established New Doctor, Back Pain (PAIN SCORE: 9/10/THE LAST MONTH/NO INJURY UP BY THE NECK AREA), and Immunizations (NO TO ALL VACCINES DUE)      ASSESSMENT/PLAN:    Ava received counseling on the following healthy behaviors: nutrition, exercise, and medication adherence  Reviewed prior labs and health maintenance  Discussed use, benefit, and side effects of prescribed medications. Barriers to medication compliance addressed. Patient given educational materials - see patient instructions  All patient questions answered. Patient voiced understanding. The patient's past medical,surgical, social, and family history as well as her current medications and allergies were reviewed as documented in today's encounter. Medications, labs, diagnostic studies, consultations and follow-up as documented in this encounter. Dawna Sparrow was seen today for established new doctor, back pain and immunizations. Diagnoses and all orders for this visit:    Anxiety and depression  -Worsening as of recently  -See note below  -No thoughts of self-harm or harming others at this time  -     [address] [facility]    Screening for hyperlipidemia  -     Lipid Panel; Future    Screening for viral disease  -     Varicella Zoster Antibody, IgG    Screening for diabetes mellitus  -     Hemoglobin A1C; Future    Other fatigue  -Worsening as of recently  -Lab work ordered  -See note below  -     CBC; Future  -     Comprehensive Metabolic Panel; Future  -     Magnesium; Future  -     TSH; Future  -     Vitamin D 25 Hydroxy; Future  -     Vitamin B12 & Folate; Future    Strain of neck muscle, initial encounter  -Not improving or worsening  -No known injury  -Been present for about a month now  -See note below  -     XR CERVICAL SPINE (2-3 VIEWS);  Future  -     tiZANidine (Ilan Cheema)

## 2023-12-26 ENCOUNTER — CLINICAL DOCUMENTATION (OUTPATIENT)
Dept: BEHAVIORAL/MENTAL HEALTH CLINIC | Age: 33
End: 2023-12-26

## 2023-12-26 ENCOUNTER — TELEPHONE (OUTPATIENT)
Dept: NEUROLOGY | Age: 33
End: 2023-12-26

## 2023-12-26 NOTE — TELEPHONE ENCOUNTER
12 26 2023 I called the patient times 2 (12 16 2023 and 12 26 2023 at [phone])  to schedule new patient appointment with one of our providers, voice mailbox has not been set up yet both times, no response. I mailed the patient a letter asking them to call the office back to schedule this appointment.   KS

## 2023-12-26 NOTE — PROGRESS NOTES
Warm handoff requested on 12/14 by DANTE Shipley CNP and was completed. Patient scheduled for future face to face visit.  Scheduled 12/26 @ 4 PM.

## 2024-10-30 NOTE — PROGRESS NOTES
POST PARTUM DAY # 1    Teresita Marina is a 29 y.o. female  This patient was seen & examined today.  on 23    Her pregnancy was complicated by:   Patient Active Problem List   Diagnosis    SAB (spontaneous )    LGSIL of cervix of undetermined significance    Late prenatal care affecting pregnancy in second trimester    Hx of cold sores    Late prenatal care    History of miscarriage    History of placenta abruption    Hx of  delivery, currently pregnant    Cocaine use complicating pregnancy    THC use in pregnancy    35 weeks gestation of pregnancy    gHTN  (G9)    FGR (G9)    Drug dependence during pregnancy in third trimester ([facility] [address]    Insufficient prenatal care in third trimester    Intrauterine growth restriction (IUGR) affecting care of mother, third trimester, single gestation    Cocaine abuse affecting pregnancy in third trimester ([facility] [address]    S/p Celestone x1 ()    Hepatitis C     25 M Apg 8/9 Wt 4#8       Today she is doing well without any chief complaint. Her lochia is light. She denies chest pain, shortness of breath, headache, lightheadedness and blurred vision. She is bottle breast feeding and she denies any breast tenderness. She is ambulating well. Her voiding pattern is normal. I reviewed signs and symptoms of post partum depression with the patient, she currently denies any of these symptoms. She is tolerating solids.      Vital Signs:  Vitals:    23 1300 23 1738 23 1920 23 0251   BP: (!) 129/91  (!) 138/90 120/89   Pulse: 91 69 83 71   Resp: 18  18 18   Temp: 97.5 °F (36.4 °C) 97.8 °F (36.6 °C) 97.5 °F (36.4 °C) 98 °F (36.7 °C)   TempSrc: Oral Oral Axillary Oral   SpO2: 98%  97% 98%       Physical Exam:  General:  no apparent distress, alert and cooperative  Neurologic:  alert, oriented, normal speech, no focal findings or movement disorder noted  Lungs:  No increased work of breathing, good air exchange, clear to auscultation bilaterally, no Pt stated that he has 1 pill remaining and requesting that this is addressed today, as it has been over 48 hours.    Thank you

## 2025-08-31 ENCOUNTER — APPOINTMENT (OUTPATIENT)
Dept: CT IMAGING | Age: 35
End: 2025-08-31

## 2025-08-31 ENCOUNTER — HOSPITAL ENCOUNTER (EMERGENCY)
Age: 35
Discharge: HOME | End: 2025-09-01
Attending: EMERGENCY MEDICINE

## 2025-08-31 ENCOUNTER — APPOINTMENT (OUTPATIENT)
Dept: GENERAL RADIOLOGY | Age: 35
End: 2025-08-31

## 2025-08-31 DIAGNOSIS — F19.929 DRUG INTOXICATION WITH COMPLICATION (HCC): Primary | ICD-10-CM

## 2025-08-31 LAB
ALBUMIN SERPL-MCNC: 3.9 G/DL (ref 3.5–5.2)
ALBUMIN/GLOB SERPL: 1.3 {RATIO} (ref 1–2.5)
ALP SERPL-CCNC: 84 U/L (ref 35–104)
ALT SERPL-CCNC: 23 U/L (ref 10–35)
AMPHETAMINE: ABNORMAL
ANION GAP SERPL CALCULATED.3IONS-SCNC: 15 MMOL/L (ref 9–16)
APAP SERPL-MCNC: <5 UG/ML (ref 10–30)
AST SERPL-CCNC: 26 U/L (ref 10–35)
BARBITURATES: ABNORMAL
BASOPHILS # BLD: 0.06 K/UL (ref 0–0.2)
BASOPHILS NFR BLD: 1 % (ref 0–2)
BENZODIAZEPINES: ABNORMAL
BILIRUB SERPL-MCNC: 0.6 MG/DL (ref 0–1.2)
BUN SERPL-MCNC: 10 MG/DL (ref 6–20)
BUPRENORPHINE: ABNORMAL
CALCIUM SERPL-MCNC: 8.8 MG/DL (ref 8.6–10.4)
CHLORIDE SERPL-SCNC: 103 MMOL/L (ref 98–107)
CO2 SERPL-SCNC: 18 MMOL/L (ref 20–31)
COCAINE: ABNORMAL
CREAT SERPL-MCNC: 0.7 MG/DL (ref 0.6–0.9)
DRUGS OF ABUSE COMMENT: ABNORMAL
EOSINOPHIL # BLD: 0.43 K/UL (ref 0–0.44)
EOSINOPHILS RELATIVE PERCENT: 4 % (ref 1–4)
ERYTHROCYTE [DISTWIDTH] IN BLOOD BY AUTOMATED COUNT: 12.3 % (ref 11.8–14.4)
ETHANOL PERCENT: <0.01 %
ETHANOLAMINE SERPL-MCNC: <10 MG/DL (ref 0–0.08)
GFR, ESTIMATED: >90 ML/MIN/1.73M2
GLUCOSE SERPL-MCNC: 112 MG/DL (ref 74–99)
HCT VFR BLD AUTO: 40.6 % (ref 36.3–47.1)
HGB BLD-MCNC: 13.8 G/DL (ref 11.9–15.1)
IMM GRANULOCYTES # BLD AUTO: 0.05 K/UL (ref 0–0.3)
IMM GRANULOCYTES NFR BLD: 1 %
LYMPHOCYTES NFR BLD: 2.56 K/UL (ref 1.1–3.7)
LYMPHOCYTES RELATIVE PERCENT: 24 % (ref 24–43)
MCH RBC QN AUTO: 29.7 PG (ref 25.2–33.5)
MCHC RBC AUTO-ENTMCNC: 34 G/DL (ref 28.4–34.8)
MCV RBC AUTO: 87.5 FL (ref 82.6–102.9)
METHADONE: ABNORMAL
METHAMPHETAMINE: ABNORMAL
MONOCYTES NFR BLD: 1.05 K/UL (ref 0.1–1.2)
MONOCYTES NFR BLD: 10 % (ref 3–12)
NEUTROPHILS NFR BLD: 60 % (ref 36–65)
NEUTS SEG NFR BLD: 6.71 K/UL (ref 1.5–8.1)
NRBC BLD-RTO: 0 PER 100 WBC
OPIATES: ABNORMAL
OXYCODONE: ABNORMAL
PHENCYCLIDINE: ABNORMAL
PLATELET # BLD AUTO: 268 K/UL (ref 138–453)
PMV BLD AUTO: 11.3 FL (ref 8.1–13.5)
POTASSIUM SERPL-SCNC: 3.5 MMOL/L (ref 3.7–5.3)
PROT SERPL-MCNC: 7 G/DL (ref 6.6–8.7)
RBC # BLD AUTO: 4.64 M/UL (ref 3.95–5.11)
SALICYLATES SERPL-MCNC: <0.5 MG/DL (ref 0–10)
SODIUM SERPL-SCNC: 136 MMOL/L (ref 136–145)
THC: ABNORMAL
TROPONIN I SERPL HS-MCNC: <6 NG/L (ref 0–14)
WBC OTHER # BLD: 10.9 K/UL (ref 3.5–11.3)

## 2025-08-31 PROCEDURE — 96374 THER/PROPH/DIAG INJ IV PUSH: CPT | Performed by: EMERGENCY MEDICINE

## 2025-08-31 PROCEDURE — 99285 EMERGENCY DEPT VISIT HI MDM: CPT | Performed by: EMERGENCY MEDICINE

## 2025-08-31 PROCEDURE — 80053 COMPREHEN METABOLIC PANEL: CPT

## 2025-08-31 PROCEDURE — 80307 DRUG TEST PRSMV CHEM ANLYZR: CPT

## 2025-08-31 PROCEDURE — 70450 CT HEAD/BRAIN W/O DYE: CPT

## 2025-08-31 PROCEDURE — 85025 COMPLETE CBC W/AUTO DIFF WBC: CPT

## 2025-08-31 PROCEDURE — 80143 DRUG ASSAY ACETAMINOPHEN: CPT

## 2025-08-31 PROCEDURE — 96375 TX/PRO/DX INJ NEW DRUG ADDON: CPT | Performed by: EMERGENCY MEDICINE

## 2025-08-31 PROCEDURE — 2580000003 HC RX 258

## 2025-08-31 PROCEDURE — 80179 DRUG ASSAY SALICYLATE: CPT

## 2025-08-31 PROCEDURE — 6360000002 HC RX W HCPCS

## 2025-08-31 PROCEDURE — 84484 ASSAY OF TROPONIN QUANT: CPT

## 2025-08-31 PROCEDURE — 93005 ELECTROCARDIOGRAM TRACING: CPT

## 2025-08-31 PROCEDURE — 96361 HYDRATE IV INFUSION ADD-ON: CPT | Performed by: EMERGENCY MEDICINE

## 2025-08-31 PROCEDURE — 71045 X-RAY EXAM CHEST 1 VIEW: CPT

## 2025-08-31 PROCEDURE — G0480 DRUG TEST DEF 1-7 CLASSES: HCPCS

## 2025-08-31 RX ORDER — ONDANSETRON 2 MG/ML
4 INJECTION INTRAMUSCULAR; INTRAVENOUS ONCE
Status: COMPLETED | OUTPATIENT
Start: 2025-08-31 | End: 2025-08-31

## 2025-08-31 RX ORDER — 0.9 % SODIUM CHLORIDE 0.9 %
1000 INTRAVENOUS SOLUTION INTRAVENOUS ONCE
Status: COMPLETED | OUTPATIENT
Start: 2025-08-31 | End: 2025-08-31

## 2025-08-31 RX ORDER — POTASSIUM CHLORIDE 7.45 MG/ML
10 INJECTION INTRAVENOUS
Status: DISPENSED | OUTPATIENT
Start: 2025-08-31 | End: 2025-09-01

## 2025-08-31 RX ADMIN — POTASSIUM CHLORIDE 10 MEQ: 7.45 INJECTION INTRAVENOUS at 23:28

## 2025-08-31 RX ADMIN — SODIUM CHLORIDE 1000 ML: 9 INJECTION, SOLUTION INTRAVENOUS at 21:25

## 2025-08-31 RX ADMIN — ONDANSETRON 4 MG: 2 INJECTION, SOLUTION INTRAMUSCULAR; INTRAVENOUS at 21:26

## 2025-08-31 ASSESSMENT — LIFESTYLE VARIABLES
HOW OFTEN DO YOU HAVE A DRINK CONTAINING ALCOHOL: PATIENT UNABLE TO ANSWER
HOW MANY STANDARD DRINKS CONTAINING ALCOHOL DO YOU HAVE ON A TYPICAL DAY: PATIENT UNABLE TO ANSWER

## 2025-09-01 VITALS
RESPIRATION RATE: 23 BRPM | DIASTOLIC BLOOD PRESSURE: 83 MMHG | SYSTOLIC BLOOD PRESSURE: 101 MMHG | HEART RATE: 77 BPM | OXYGEN SATURATION: 99 % | TEMPERATURE: 99.8 F

## 2025-09-01 LAB
AMPHET UR QL SCN: NEGATIVE
BARBITURATES UR QL SCN: NEGATIVE
BENZODIAZ UR QL: NEGATIVE
CANNABINOIDS UR QL SCN: POSITIVE
COCAINE UR QL SCN: POSITIVE
FENTANYL UR QL: NEGATIVE
METHADONE UR QL: NEGATIVE
OPIATES UR QL SCN: NEGATIVE
OXYCODONE UR QL SCN: NEGATIVE
PCP UR QL SCN: NEGATIVE
TEST INFORMATION: ABNORMAL

## 2025-09-01 PROCEDURE — 80307 DRUG TEST PRSMV CHEM ANLYZR: CPT

## 2025-09-02 LAB
EKG ATRIAL RATE: 112 BPM
EKG P AXIS: 50 DEGREES
EKG P-R INTERVAL: 118 MS
EKG Q-T INTERVAL: 344 MS
EKG QRS DURATION: 84 MS
EKG QTC CALCULATION (BAZETT): 469 MS
EKG R AXIS: 67 DEGREES
EKG T AXIS: 69 DEGREES
EKG VENTRICULAR RATE: 112 BPM